# Patient Record
Sex: FEMALE | Race: WHITE
[De-identification: names, ages, dates, MRNs, and addresses within clinical notes are randomized per-mention and may not be internally consistent; named-entity substitution may affect disease eponyms.]

---

## 2018-03-25 ENCOUNTER — HOSPITAL ENCOUNTER (EMERGENCY)
Dept: HOSPITAL 97 - ER | Age: 33
Discharge: HOME | End: 2018-03-25
Payer: COMMERCIAL

## 2018-03-25 VITALS — OXYGEN SATURATION: 100 % | TEMPERATURE: 98 F

## 2018-03-25 VITALS — SYSTOLIC BLOOD PRESSURE: 118 MMHG | DIASTOLIC BLOOD PRESSURE: 76 MMHG

## 2018-03-25 DIAGNOSIS — R53.1: Primary | ICD-10-CM

## 2018-03-25 LAB
BUN BLD-MCNC: 8 MG/DL (ref 6–20)
GLUCOSE SERPLBLD-MCNC: 109 MG/DL (ref 65–120)
HCT VFR BLD CALC: 40.6 % (ref 36–45)
INR BLD: 1.04
LYMPHOCYTES # SPEC AUTO: 2.8 K/UL (ref 0.7–4.9)
MCH RBC QN AUTO: 25.1 PG (ref 27–35)
MCV RBC: 77.5 FL (ref 80–100)
PMV BLD: 7.7 FL (ref 7.6–11.3)
POTASSIUM SERPL-SCNC: 3.6 MEQ/L (ref 3.6–5)
RBC # BLD: 5.24 M/UL (ref 3.86–4.86)

## 2018-03-25 PROCEDURE — 80048 BASIC METABOLIC PNL TOTAL CA: CPT

## 2018-03-25 PROCEDURE — 85025 COMPLETE CBC W/AUTO DIFF WBC: CPT

## 2018-03-25 PROCEDURE — 82962 GLUCOSE BLOOD TEST: CPT

## 2018-03-25 PROCEDURE — 36415 COLL VENOUS BLD VENIPUNCTURE: CPT

## 2018-03-25 PROCEDURE — 99283 EMERGENCY DEPT VISIT LOW MDM: CPT

## 2018-03-25 PROCEDURE — 70450 CT HEAD/BRAIN W/O DYE: CPT

## 2018-03-25 PROCEDURE — 85610 PROTHROMBIN TIME: CPT

## 2018-03-25 PROCEDURE — 85730 THROMBOPLASTIN TIME PARTIAL: CPT

## 2018-03-25 PROCEDURE — 71045 X-RAY EXAM CHEST 1 VIEW: CPT

## 2018-03-25 NOTE — ER
Nurse's Notes                                                                                     

 [facility]                                                                

Name: Catalina Orosco                                                                                

Age: 32 yrs                                                                                       

Sex: Female                                                                                       

: 1985                                                                                   

MRN: [id number]                                                                                   

Arrival Date: 2018                                                                          

Time: 12:49                                                                                       

Account#: [id number]                                                                            

Bed 5                                                                                             

Private MD:                                                                                       

Diagnosis: Weakness                                                                               

                                                                                                  

Presentation:                                                                                     

                                                                                             

12:44 Presenting complaint: Patient states: Headache, left arm tingling, left sided weakness, hb  

      inability to hold head up that came on suddenly while at work. Pt reports she became        

      dizzy and decided to sit down just before the weakness and tingling started. Hx             

      migraines, had migraine last night that resolved before waking today. Last known normal     

      1230 today. Transition of care: patient was not received from another setting of care.      

      No acute neurological deficit is noted. Onset of symptoms was 2018 at 12:30.      

      Care prior to arrival: None.                                                                

12:44 Method Of Arrival: Wheelchair                                                           hb  

12:44 Acuity: ALYSIA 2                                                                           hb  

                                                                                                  

OB/GYN:                                                                                           

12:49 LMP 3/5/2018                                                                            hb  

                                                                                                  

Stroke Activation: Symptom onset < 3 hours                                                        

 Physician: Stroke Attending; Name: Dr. Padilla; Notified At:  12:44; Arrived             

  At:                                                                                             

 Physician: Chief Stroke Resident; Name: ; Notified At:  12:44; Arrived At:             

 Physician: Stroke Resident; Name: ; Notified At:  12:44; Arrived At:                   

 Physician: ED Attending; Name: ; Notified At:  12:44; Arrived At:                      

 Physician: ED Resident; Name: ; Notified At:  12:44; Arrived At:                       

                                                                                                  

Historical:                                                                                       

- Allergies:                                                                                      

13:22 No Known Allergies;                                                                     hb  

- Home Meds:                                                                                      

13:22 None [Active];                                                                          hb  

- PMHx:                                                                                           

13:22 Migraines;                                                                              hb  

- PSHx:                                                                                           

13:22 Ear Tubes; Cyst removed from breast;                                                    hb  

                                                                                                  

- Immunization history:: Adult Immunizations up to date.                                          

- Social history:: Smoking status: Patient/guardian denies using tobacco.                         

                                                                                                  

                                                                                                  

Screenin:55 Abuse screen: Denies threats or abuse. Denies injuries from another. Nutritional        hb  

      screening: No deficits noted. Tuberculosis screening: No symptoms or risk factors           

      identified. Fall Risk Total [place] Fall Scale indicates Low Risk Score (25-44 pts). Fall     

      prevention measures have been instituted. Side Rails Up X 2 Frequent Obs/Assesments         

      occuring As available Patient and Family Educated on Fall Prevention Program and            

      strategies.                                                                                 

                                                                                                  

Assessment:                                                                                       

12:44 Reassessment: Code stroke called, pt transported to CT via wheelchair. Pt slouching in    

      wheelchair, head hanging to right side, reports she is unable to hold her own head          

      upright.                                                                                    

12:49 Reassessment: Pt transferred to bed with minimal assistance, steady gait, head upright, hb  

      and used both arms to adjust self in the bed.                                               

12:50 Reassessment: 20g to RIGHT AC. Reassessment: labs sent with purple sticker to outside     

      lab by phlebotomy tech.                                                                     

12:53 Reassessment: BGL 58.                                                                   hb  

12:54 Reassessment: Dr. Padilla at bedside to evaluate pt.                                      hb  

13:01 Reassessment: Negative CT results called to Dr. Padilla.                                  hb  

13:02 Reassessment: 1/2 amp Dextrose administered IVP as ordered.                             hb  

13:05 Reassessment: PCXR done.                                                                hb  

13:06 Reassessment: Bedside Swallow Screen completed.                                         hb  

13:08 Patient has been NPO before screening. The patient is alert, and able to follow           

      commands. The patient does not exhibit slurred or garbled speech. The patient is            

      exhibiting difficulty speaking. The patient does not exhibit difficulty understanding       

      words. The patient is able to swallow own secretions with no drooling or need for           

      suction. Patient tolerated one teaspoon of water. No drooling, immediate coughing,          

      gurgling, or clearing of the throat was noted. The patient tolerated 90mL of water. No      

      drooling, immediate coughing, gurgling, or clearing of the throat was noted. The            

      patient passed the bedside swallow screening. Oral medications may be given as ordered.     

      Contact Physician for further diet orders. Provider notified of bedside swallow             

      screening results: Timi Padilla MD. T-PA (Activase) Screening: Indications: Definite       

      evidence of stroke, ischemic, embolic, or hypertensive: No.                                 

13:08 Reassessment: Decision to NOT tPA made by Dr. Padilla.                                    hb  

13:30 General: Appears in no apparent distress. Behavior is calm, cooperative. Pain: Denies     

      pain. Neuro: Level of Consciousness is awake, alert, obeys commands, Oriented to            

      person, place, time, situation,  are equal bilaterally Moves all extremities. Gait     

      is steady, Speech is normal, Facial symmetry appears normal, Pupils are PERRLA, Intact.     

13:30 Cardiovascular: Capillary refill < 3 seconds Patient's skin is warm and dry.            hb  

      Respiratory: Airway is patent Trachea midline Respiratory effort is even, unlabored,        

      Respiratory pattern is regular, symmetrical, Breath sounds are clear bilaterally. GI:       

      No signs and/or symptoms were reported involving the gastrointestinal system. : No        

      signs and/or symptoms were reported regarding the genitourinary system. EENT: No signs      

      and/or symptoms were reported regarding the EENT system. Derm: No signs and/or symptoms     

      reported regarding the dermatologic system. Skin is intact, is healthy with good            

      turgor, Skin is pink, warm \T\ dry. Musculoskeletal: Reports weakness in left arm and       

      left leg.                                                                                   

14:30 Reassessment: Patient appears in no apparent distress at this time. Patient and/or      hb  

      family updated on plan of care and expected duration. Pain level reassessed. Patient is     

      alert, oriented x 3, equal unlabored respirations, skin warm/dry/pink. Patient denies       

      pain at this time.                                                                          

15:20 Reassessment: Patient appears in no apparent distress at this time. Patient and/or      hb  

      family updated on plan of care and expected duration. Pain level reassessed. Patient is     

      alert, oriented x 3, equal unlabored respirations, skin warm/dry/pink. Patient denies       

      pain at this time. Patient states feeling better. Patient states symptoms have improved.    

                                                                                                  

Vital Signs:                                                                                      

12:49  / 91; Pulse 75; Resp 16; Temp 98; Pulse Ox 100% on R/A; Pain 0/10;               hb  

13:59  / 88; Pulse 69; Resp 18; Pulse Ox 100% ;                                         sv  

14:45  / 76; Pulse 70; Resp 15; Pulse Ox 100% on R/A;                                   hb  

                                                                                                  

NIH Stroke Scale Scores:                                                                          

12:59 NIHSS Score: 0                                                                          hb  

12:59 NIHSS Score: 0                                                                          hb  

14:57 NIHSS Score: 0                                                                          gs  

                                                                                                  

ED Course:                                                                                        

12:49 Patient arrived in ED.                                                                    

12:49 Timi Padilla MD is Attending Physician.                                              gs  

12:50 Inserted saline lock: 20 gauge in right antecubital area, using aseptic technique.      hb  

      Blood collected.                                                                            

12:58 Patient has correct armband on for positive identification. Placed in gown. Bed in low  hb  

      position. Call light in reach. Side rails up X 1.                                           

13:05 Arm band placed on right wrist.                                                         hb  

13:06 Gabi Menon, RN is Primary Nurse.                                                   hb  

13:19 Triage completed.                                                                       hb  

15:21 No provider procedures requiring assistance completed. IV discontinued, intact,         hb  

      bleeding controlled, No redness/swelling at site. Pressure dressing applied.                

                                                                                                  

Administered Medications:                                                                         

No medications were administered                                                                  

                                                                                                  

                                                                                                  

Point of Care Testing:                                                                            

      Blood Glucose:                                                                              

12:53 Blood Glucose: 58 mg/dL;                                                                hb  

13:29 Blood Glucose: 149 mg/dL;                                                               hb  

      Ranges:                                                                                     

                                                                                                  

Outcome:                                                                                          

15:03 Discharge ordered by MD.                                                                gs  

15:21 Discharged to home ambulatory, with friend.                                             hb  

15:21 Condition: stable                                                                           

15:21 Discharge instructions given to patient, Instructed on discharge instructions, follow       

      up and referral plans. medication usage, Demonstrated understanding of instructions,        

      follow-up care, medications.                                                                

15:22 Patient left the ED.                                                                      

                                                                                                  

                                                                                                  

NIH Stroke Scale - NIH Stroke Score                                                               

Date: 2018                                                                                  

Time: 12:59                                                                                       

Total Score = 0                                                                                   

  1a. Level of Consciousness (LOC) - 0(Alert)                                                     

  1b. Level of Consciousness (LOC) (Year \T\ Age) - 0(Both)                                       

  1c. LOC Commands (Open \T\ Closes Eyes/) - 0(Both)                                          

   2. Best Gaze (Lateral Gaze Paresis) - 0(Normal)                                                

   3. Visual Field Loss - 0(No visual loss)                                                       

   4. Facial Palsy - 0(Normal)                                                                    

  5a. Left Arm: Motor (10-second hold) - 0(No drift)                                              

  5b. Right Arm: Motor (10-second hold) - 0(No drift)                                             

  6a. Left Leg: Motor (5-second hold - always test supine) - 0(No drift)                          

  6b. Right Leg: Motor (5-second hold - always test supine) - 0(No drift)                         

   7. Limb Ataxia (finger/nose \T\ heel/shin - test with eyes open) - 0(Absent)                   

   8. Sensory Loss (pinprick arms/legs/face) - 0(Normal)                                          

   9. Best Language: Aphasia (description/naming/reading) - 0(No aphasia)                         

  10. Dysarthria (speech clarity - read or repeat words) - 0(Normal)                              

  11. Extinction and Inattention (visual/tactile/auditory/spatial/personal) - 0(No                

      abnormality)                                                                                

Initials:                                                                                       

                                                                                                  

                                                                                                  

NIH Stroke Scale - NIH Stroke Score                                                               

Date: 2018                                                                                  

Time: 12:59                                                                                       

Total Score = 0                                                                                   

  1a. Level of Consciousness (LOC) - 0(Alert)                                                     

  1b. Level of Consciousness (LOC) (Year \T\ Age) - 0(Both)                                       

  1c. LOC Commands (Open \T\ Closes Eyes/) - 0(Both)                                          

   2. Best Gaze (Lateral Gaze Paresis) - 0(Normal)                                                

   3. Visual Field Loss - 0(No visual loss)                                                       

   4. Facial Palsy - 0(Normal)                                                                    

  5a. Left Arm: Motor (10-second hold) - 0(No drift)                                              

  5b. Right Arm: Motor (10-second hold) - 0(No drift)                                             

  6a. Left Leg: Motor (5-second hold - always test supine) - 0(No drift)                          

  6b. Right Leg: Motor (5-second hold - always test supine) - 0(No drift)                         

   7. Limb Ataxia (finger/nose \T\ heel/shin - test with eyes open) - 0(Absent)                   

   8. Sensory Loss (pinprick arms/legs/face) - 0(Normal)                                          

   9. Best Language: Aphasia (description/naming/reading) - 0(No aphasia)                         

  10. Dysarthria (speech clarity - read or repeat words) - 0(Normal)                              

  11. Extinction and Inattention (visual/tactile/auditory/spatial/personal) - 0(No                

      abnormality)                                                                                

Initials:                                                                                       

                                                                                                  

                                                                                                  

NIH Stroke Scale - NIH Stroke Score                                                               

Date: 2018                                                                                  

Time: 14:57                                                                                       

Total Score = 0                                                                                   

  1a. Level of Consciousness (LOC) - 0(Alert)                                                     

  1b. Level of Consciousness (LOC) (Year \T\ Age) - 0(Both)                                       

  1c. LOC Commands (Open \T\ Closes Eyes/) - 0(Both)                                          

   2. Best Gaze (Lateral Gaze Paresis) - 0(Normal)                                                

   3. Visual Field Loss - 0(No visual loss)                                                       

   4. Facial Palsy - 0(Normal)                                                                    

  5a. Left Arm: Motor (10-second hold) - 0(No drift)                                              

  5b. Right Arm: Motor (10-second hold) - 0(No drift)                                             

  6a. Left Leg: Motor (5-second hold - always test supine) - 0(No drift)                          

  6b. Right Leg: Motor (5-second hold - always test supine) - 0(No drift)                         

   7. Limb Ataxia (finger/nose \T\ heel/shin - test with eyes open) - 0(Absent)                   

   8. Sensory Loss (pinprick arms/legs/face) - 0(Normal)                                          

   9. Best Language: Aphasia (description/naming/reading) - 0(No aphasia)                         

  10. Dysarthria (speech clarity - read or repeat words) - 0(Normal)                              

  11. Extinction and Inattention (visual/tactile/auditory/spatial/personal) - 0(No                

      abnormality)                                                                                

Initials: gs                                                                                      

                                                                                                  

Signatures:                                                                                       

Lizbeth Rodriguez RN RN                                                      

Isela Quezada RN RN                                                      

Gabi Menon RN RN                                                      

Timi Padilla MD MD   gs                                                   

                                                                                                  

Corrections: (The following items were deleted from the chart)                                    

13:45 13:24 Blood Glucose: Blood Glucose Qpixpsg=331 mg/dL. hb                        hb          

                                                                                                  

**************************************************************************************************

## 2018-03-25 NOTE — RAD REPORT
EXAM DESCRIPTION:  CT - Ct Stroke Brain Wo Cont - 3/25/2018 1:13 pm

 

CLINICAL HISTORY:  Migraine headache history, left-sided weakness, stroke protocol study

 

CLINICAL HISTORY:  CT June 2012

 

TECHNIQUE:  Axial 5 millimeter thick images of the head were obtained without IV contrast.

 

All CT scans are performed using dose optimization technique as appropriate and may include automated
 exposure control or mA/KV adjustment according to patient size.

 

FINDINGS:  No intracranial hemorrhage, mass, or cerebral edema. No acute infarction identifiable. No 
extra-axial fluid collections.  Gray matter-white matter differentiation is preserved.

 

 

Visualized portions of the mastoid air cells, paranasal sinuses, and orbits are unremarkable.

 

Imaging was only initially available in the exception folder of the PACs system. This precluded an im
mediate dictation. Findings were telephoned to Dr. Padilla 12:59 p.m.

 

IMPRESSION:  No CT evidence of acute intracranial process. No significant change from prior study.

## 2018-03-25 NOTE — EDPHYS
Physician Documentation                                                                           

 [facility]                                                                

Name: Catalina Orosco                                                                                

Age: 32 yrs                                                                                       

Sex: Female                                                                                       

: 1985                                                                                   

MRN: [id number]                                                                                   

Arrival Date: 2018                                                                          

Time: 12:49                                                                                       

Account#: [id number]                                                                            

Bed 5                                                                                             

Private MD:                                                                                       

ED Physician Timi Padilla                                                                       

HPI:                                                                                              

                                                                                             

14:57 This 32 yrs old  Female presents to ER via Wheelchair with complaints of       gs  

      Dizziness/general..                                                                         

14:57 The patient's problem is reported as weakness, that is generalized, dizziness. Onset:   gs  

      The symptoms/episode began/occurred gradually, this morning. Duration: The episode is       

      continuous. Context: occurred at home, went to work felt worse. The symptoms are            

      alleviated by nothing. The symptoms are aggravated by nothing. Associated signs and         

      symptoms: Pertinent positives: weakness, tightness in arms and legs doesn't want to         

      move due to pain. Severity of symptoms: At their worst the symptoms were moderate in        

      the emergency department the symptoms are unchanged. The patient has experienced            

      similar episodes in the past, a few times. had migraine last pm usual onset throbbing       

      resolved with exedrin.                                                                      

                                                                                                  

OB/GYN:                                                                                           

12:49 LMP 3/5/2018                                                                            hb  

                                                                                                  

Historical:                                                                                       

- Allergies:                                                                                      

13:22 No Known Allergies;                                                                     hb  

- Home Meds:                                                                                      

13:22 None [Active];                                                                          hb  

- PMHx:                                                                                           

13:22 Migraines;                                                                              hb  

- PSHx:                                                                                           

13:22 Ear Tubes; Cyst removed from breast;                                                    hb  

                                                                                                  

- Immunization history:: Adult Immunizations up to date.                                          

- Social history:: Smoking status: Patient/guardian denies using tobacco.                         

                                                                                                  

                                                                                                  

ROS:                                                                                              

14:57 All other systems are negative.                                                         gs  

                                                                                                  

Exam:                                                                                             

14:57 Head/Face:  Normocephalic, atraumatic. Eyes:  Pupils equal round and reactive to light, gs  

      extra-ocular motions intact.  Lids and lashes normal.  Conjunctiva and sclera are           

      non-icteric and not injected.  Cornea within normal limits.  Periorbital areas with no      

      swelling, redness, or edema. ENT:  Nares patent. No nasal discharge, no septal              

      abnormalities noted.  Tympanic membranes are normal and external auditory canals are        

      clear.  Oropharynx with no redness, swelling, or masses, exudates, or evidence of           

      obstruction, uvula midline.  Mucous membranes moist. Neck:  Trachea midline, no             

      thyromegaly or masses palpated, and no cervical lymphadenopathy.  Supple, full range of     

      motion without nuchal rigidity, or vertebral point tenderness.  No Meningismus.             

      Chest/axilla:  Normal chest wall appearance and motion.  Nontender with no deformity.       

      No lesions are appreciated. Cardiovascular:  Regular rate and rhythm with a normal S1       

      and S2.  No gallops, murmurs, or rubs.  Normal PMI, no JVD.  No pulse deficits.             

      Respiratory:  Lungs have equal breath sounds bilaterally, clear to auscultation and         

      percussion.  No rales, rhonchi or wheezes noted.  No increased work of breathing, no        

      retractions or nasal flaring. Abdomen/GI:  Soft, non-tender, with normal bowel sounds.      

      No distension or tympany.  No guarding or rebound.  No evidence of tenderness               

      throughout. Back:  No spinal tenderness.  No costovertebral tenderness.  Full range of      

      motion. Skin:  Warm, dry with normal turgor.  Normal color with no rashes, no lesions,      

      and no evidence of cellulitis. MS/ Extremity:  Pulses equal, no cyanosis.                   

      Neurovascular intact.  Full, normal range of motion. Neuro:  Awake and alert, GCS 15,       

      oriented to person, place, time, and situation.  Cranial nerves II-XII grossly intact.      

      Motor strength 5/5 in all extremities.  Sensory grossly intact.  Cerebellar exam            

      normal.  Normal gait.                                                                       

14:57 Neuro: doesn't want to move left arm and leg dur to feels tight no drift when lifts         

      arms or legs.                                                                               

                                                                                                  

Vital Signs:                                                                                      

12:49  / 91; Pulse 75; Resp 16; Temp 98; Pulse Ox 100% on R/A; Pain 0/10;               hb  

13:59  / 88; Pulse 69; Resp 18; Pulse Ox 100% ;                                         sv  

14:45  / 76; Pulse 70; Resp 15; Pulse Ox 100% on R/A;                                   hb  

                                                                                                  

NIH Stroke Scale Scores:                                                                          

12:59 NIHSS Score: 0                                                                          hb  

12:59 NIHSS Score: 0                                                                          hb  

14:57 NIHSS Score: 0                                                                          gs  

                                                                                                  

MDM:                                                                                              

12:58 Patient medically screened.                                                               

14:57 Differential diagnosis: CVA, TIA, paralysis, metabolic disorder, drug effects. Data       

      reviewed: vital signs, nurses notes. Response to treatment: the patient's symptoms have     

      resolved after treatment, and as a result, I will discharge patient.                        

                                                                                                  

                                                                                             

12:50 Order name: Basic Metabolic Panel                                                         

                                                                                             

12:50 Order name: CBC with Diff                                                                                                                                                            

12:50 Order name: Protime (+inr)                                                                                                                                                           

12:50 Order name: Ptt, Activated                                                                                                                                                           

12:50 Order name: Urine Microscopic Only                                                                                                                                                   

13:06 Order name: Glucose, Ancillary Testing; Complete Time: 14:25                            EDMS

                                                                                             

12:50 Order name: CT Stroke Brain w/o Contrast                                                                                                                                             

12:50 Order name: Stroke CXR 1 View                                                                                                                                                        

13:11 Order name: CBC with Automated Diff; Complete Time: 14:25                               EDMS

                                                                                             

13:14 Order name: Protime (+INR); Complete Time: 14:25                                        EDMS

                                                                                             

13:14 Order name: PTT, Activated Partial Thromb; Complete Time: 14:25                         EDMS

                                                                                             

13:15 Order name: Basic Metabolic Panel; Complete Time: 14:25                                 EDMS

                                                                                             

13:30 Order name: CT; Complete Time: 14:25                                                    EDMS

                                                                                             

13:30 Order name: Glucose, Ancillary Testing; Complete Time: 14:25                            EDMS

                                                                                             

12:50 Order name: Accucheck; Complete Time: 13:01                                                                                                                                          

12:50 Order name: Cardiac monitoring; Complete Time: 13:02                                                                                                                                 

12:50 Order name: EKG - Nurse/Tech; Complete Time: 14:10                                                                                                                                   

12:50 Order name: IV Saline Lock; Complete Time: 13:02                                                                                                                                     

12:50 Order name: Labs collected and sent; Complete Time: 14:10                                                                                                                            

12:50 Order name: NPO; Complete Time: 15:04                                                                                                                                                

12:50 Order name: O2 Per Protocol; Complete Time: 13:02                                                                                                                                    

12:50 Order name: O2 Sat Monitoring; Complete Time: 13:02                                                                                                                                  

12:50 Order name: Stroke Swallow Screen; Complete Time: 14:10                                                                                                                              

13:31 Order name: RAD; Complete Time: 14:25                                                   EDMS

                                                                                                  

Administered Medications:                                                                         

No medications were administered                                                                  

                                                                                                  

                                                                                                  

Point of Care Testing:                                                                            

      Blood Glucose:                                                                              

12:53 Blood Glucose: 58 mg/dL;                                                                hb  

13:29 Blood Glucose: 149 mg/dL;                                                               hb  

      Ranges:                                                                                     

      Critical Glucose Levels:Adult <50 mg/dl or >400 mg/dl  <40 mg/dl or >180 mg/dl       

Disposition:                                                                                      

18 15:03 Discharged to Home. Impression: Weakness.                                          

- Condition is Stable.                                                                            

- Discharge Instructions: Weakness, Fatigue.                                                      

                                                                                                  

- Medication Reconciliation Form, Thank You Letter, Antibiotic Education, Prescription            

  Opioid Use form.                                                                                

- Follow up: Private Physician; When: 2 - 3 days; Reason: Re-evaluation by your                   

  physician.                                                                                      

                                                                                                  

                                                                                                  

                                                                                                  

                                                                                                  

NIH Stroke Scale - NIH Stroke Score                                                               

Date: 2018                                                                                  

Time: 12:59                                                                                       

Total Score = 0                                                                                   

  1a. Level of Consciousness (LOC) - 0(Alert)                                                     

  1b. Level of Consciousness (LOC) (Year \T\ Age) - 0(Both)                                       

  1c. LOC Commands (Open \T\ Closes Eyes/) - 0(Both)                                          

   2. Best Gaze (Lateral Gaze Paresis) - 0(Normal)                                                

   3. Visual Field Loss - 0(No visual loss)                                                       

   4. Facial Palsy - 0(Normal)                                                                    

  5a. Left Arm: Motor (10-second hold) - 0(No drift)                                              

  5b. Right Arm: Motor (10-second hold) - 0(No drift)                                             

  6a. Left Leg: Motor (5-second hold - always test supine) - 0(No drift)                          

  6b. Right Leg: Motor (5-second hold - always test supine) - 0(No drift)                         

   7. Limb Ataxia (finger/nose \T\ heel/shin - test with eyes open) - 0(Absent)                   

   8. Sensory Loss (pinprick arms/legs/face) - 0(Normal)                                          

   9. Best Language: Aphasia (description/naming/reading) - 0(No aphasia)                         

  10. Dysarthria (speech clarity - read or repeat words) - 0(Normal)                              

  11. Extinction and Inattention (visual/tactile/auditory/spatial/personal) - 0(No                

      abnormality)                                                                                

Initials:                                                                                       

                                                                                                  

                                                                                                  

NIH Stroke Scale - NIH Stroke Score                                                               

Date: 2018                                                                                  

Time: 12:59                                                                                       

Total Score = 0                                                                                   

  1a. Level of Consciousness (LOC) - 0(Alert)                                                     

  1b. Level of Consciousness (LOC) (Year \T\ Age) - 0(Both)                                       

  1c. LOC Commands (Open \T\ Closes Eyes/) - 0(Both)                                          

   2. Best Gaze (Lateral Gaze Paresis) - 0(Normal)                                                

   3. Visual Field Loss - 0(No visual loss)                                                       

   4. Facial Palsy - 0(Normal)                                                                    

  5a. Left Arm: Motor (10-second hold) - 0(No drift)                                              

  5b. Right Arm: Motor (10-second hold) - 0(No drift)                                             

  6a. Left Leg: Motor (5-second hold - always test supine) - 0(No drift)                          

  6b. Right Leg: Motor (5-second hold - always test supine) - 0(No drift)                         

   7. Limb Ataxia (finger/nose \T\ heel/shin - test with eyes open) - 0(Absent)                   

   8. Sensory Loss (pinprick arms/legs/face) - 0(Normal)                                          

   9. Best Language: Aphasia (description/naming/reading) - 0(No aphasia)                         

  10. Dysarthria (speech clarity - read or repeat words) - 0(Normal)                              

  11. Extinction and Inattention (visual/tactile/auditory/spatial/personal) - 0(No                

      abnormality)                                                                                

Initials:                                                                                       

                                                                                                  

                                                                                                  

NIH Stroke Scale - NIH Stroke Score                                                               

Date: 2018                                                                                  

Time: 14:57                                                                                       

Total Score = 0                                                                                   

  1a. Level of Consciousness (LOC) - 0(Alert)                                                     

  1b. Level of Consciousness (LOC) (Year \T\ Age) - 0(Both)                                       

  1c. LOC Commands (Open \T\ Closes Eyes/) - 0(Both)                                          

   2. Best Gaze (Lateral Gaze Paresis) - 0(Normal)                                                

   3. Visual Field Loss - 0(No visual loss)                                                       

   4. Facial Palsy - 0(Normal)                                                                    

  5a. Left Arm: Motor (10-second hold) - 0(No drift)                                              

  5b. Right Arm: Motor (10-second hold) - 0(No drift)                                             

  6a. Left Leg: Motor (5-second hold - always test supine) - 0(No drift)                          

  6b. Right Leg: Motor (5-second hold - always test supine) - 0(No drift)                         

   7. Limb Ataxia (finger/nose \T\ heel/shin - test with eyes open) - 0(Absent)                   

   8. Sensory Loss (pinprick arms/legs/face) - 0(Normal)                                          

   9. Best Language: Aphasia (description/naming/reading) - 0(No aphasia)                         

  10. Dysarthria (speech clarity - read or repeat words) - 0(Normal)                              

  11. Extinction and Inattention (visual/tactile/auditory/spatial/personal) - 0(No                

      abnormality)                                                                                

Initials:                                                                                       

                                                                                                  

Signatures:                                                                                       

Dispatcher [employer]                           EDMS                                                 

Isela Quezada RN RN                                                      

Menon, Gabi, RN                     RN                                                      

Timi Padilla MD MD   gs                                                   

                                                                                                  

Corrections: (The following items were deleted from the chart)                                    

15:01 14:57 This 32 yrs old  Female presents to ER via Wheelchair with       gs          

      complaints of Dizziness/general weaknessS/S of Possible Stroke. gs                          

                                                                                                  

**************************************************************************************************

## 2018-03-25 NOTE — RAD REPORT
EXAM DESCRIPTION:  RAD - Chest Single View - 3/25/2018 1:23 pm

 

CLINICAL HISTORY:  Code stroke chest film

 

COMPARISON:  None.

 

TECHNIQUE:  AP portable chest image was obtained 1302 hours .

 

FINDINGS:  Lungs are clear. Heart and vasculature are normal. No measurable pleural effusion and no p
neumothorax. No gross bony abnormality seen. No acute aortic findings suspected.

 

IMPRESSION:  No acute cardiopulmonary process.

## 2018-10-11 ENCOUNTER — HOSPITAL ENCOUNTER (OUTPATIENT)
Dept: HOSPITAL 97 - OR | Age: 33
Discharge: HOME | End: 2018-10-11
Attending: OBSTETRICS & GYNECOLOGY
Payer: COMMERCIAL

## 2018-10-11 VITALS — TEMPERATURE: 97.1 F | SYSTOLIC BLOOD PRESSURE: 114 MMHG | DIASTOLIC BLOOD PRESSURE: 66 MMHG | OXYGEN SATURATION: 98 %

## 2018-10-11 DIAGNOSIS — N94.6: ICD-10-CM

## 2018-10-11 DIAGNOSIS — N92.1: Primary | ICD-10-CM

## 2018-10-11 DIAGNOSIS — K58.9: ICD-10-CM

## 2018-10-11 DIAGNOSIS — N88.2: ICD-10-CM

## 2018-10-11 PROCEDURE — 81025 URINE PREGNANCY TEST: CPT

## 2018-10-11 PROCEDURE — 88305 TISSUE EXAM BY PATHOLOGIST: CPT

## 2018-10-11 PROCEDURE — 0UJD8ZZ INSPECTION OF UTERUS AND CERVIX, VIA NATURAL OR ARTIFICIAL OPENING ENDOSCOPIC: ICD-10-PCS

## 2018-10-11 PROCEDURE — 0UDB7ZX EXTRACTION OF ENDOMETRIUM, VIA NATURAL OR ARTIFICIAL OPENING, DIAGNOSTIC: ICD-10-PCS

## 2018-10-11 RX ADMIN — LIDOCAINE HYDROCHLORIDE AND EPINEPHRINE ONE ML: 10; 10 INJECTION, SOLUTION INFILTRATION; PERINEURAL at 10:38

## 2018-10-11 RX ADMIN — LIDOCAINE HYDROCHLORIDE AND EPINEPHRINE ONE ML: 10; 10 INJECTION, SOLUTION INFILTRATION; PERINEURAL at 10:30

## 2018-10-11 NOTE — XMS REPORT
[facility]

 Created on:2018



Patient:Catalina Orosco

Sex:Female

:1985

External Reference #:[id number]





Demographics







 Address  [address] [place] [address] [place] [address]

 

 Phone  Unavailable

 

 Preferred Language  en

 

 Marital Status  Unknown

 

 Jainism Affiliation  Unknown

 

 Race  White

 

 Ethnic Group  Not  or 









Author







 Organization  [employer]









Care Team Providers







 Name  Role  Phone

 

 Julian, Gualberto  Provider Role  Unavailable









Allergies

No Known Allergies



Problems







 Problem Type  Condition  Code  Onset Dates  Condition Status

 

 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



   grade dysplasia      

 

 Assessment  Migraine, unspecified, not  G43.901    Active



   intractable, with status      



   migrainosus      

 

 Assessment  Cervicalgia  M54.2    Active

 

 Assessment  Microcytic anemia  D50.9    Active

 

 Problem  Microcytic anemia  D50.9    Active

 

 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



   grade dysplasia      

 

 Problem  Cervicalgia  M54.2    Active

 

 Problem  Anxiety  F41.9    Active

 

 Problem  Hypoglycemia  E16.2    Active

 

 Problem  Migraine, unspecified, not  G43.901    Active



   intractable, with status      



   migrainosus      

 

 Problem  Lumbar sprain  S33.5XXA    Active







Medications







 Medication  Code  Code  Instructions  Start  End  Status  Dosage



   System      Date  Date    

 

 Excedrin Tension  NDC  25556710072  500-65 MG      Active  2 tablets



 Headache      Orally Three        as needed



       times a day        

 

 Bentyl  NDC  40696072893  20 MG Orally  March    Active  1 tablet



       Four times a  2018      



       day        

 

 Ondansetron HCl  NDC  83330725503  4 MG Orally  March    Active  not defined



         2018      

 

 Pantoprazole  NDC  86928631690  40 MG Orally      Active  1 tablet



 Sodium      Once a day        

 

 Sumatriptan  NDC  26644675034  50 MG Orally  April    Active  1 tablet as



 Succinate      take at start  2018      needed



       of migrane, if        



       no relief take        



       1 more in 2        



       hours.  Max        



       200mg/day        

 

 Diclofenac  NDC  [id number]  1 % Transdermal    Active  as directed



 Sodium      Three times a  02, 2018  29,    



       day    2018    







Results

No Known Results



Summary Purpose

[employer]Clinical[employer] Submission

## 2018-10-11 NOTE — XMS REPORT
[facility]

 Created on:May 2, 2018



Patient:Catalina Orosco

Sex:Female

:1985

External Reference #:[id number]





Demographics







 Address  [address] [place] [address] [place] [address]

 

 Phone  Unavailable

 

 Preferred Language  en

 

 Marital Status  Unknown

 

 Synagogue Affiliation  Unknown

 

 Race  White

 

 Ethnic Group  Not  or 









Author







 Organization  [employer]









Care Team Providers







 Name  Role  Phone

 

 Liz Chance  Provider Role  Unavailable









Allergies

No Known Allergies



Problems







 Problem Type  Condition  Code  Onset Dates  Condition Status

 

 Problem  Microcytic anemia  D50.9    Active

 

 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



   grade dysplasia      

 

 Problem  Cervicalgia  M54.2    Active

 

 Problem  Anxiety  F41.9    Active

 

 Problem  Hypoglycemia  E16.2    Active

 

 Problem  Migraine, unspecified, not  G43.901    Active



   intractable, with status      



   migrainosus      

 

 Problem  Lumbar sprain  S33.5XXA    Active







Medications

No Known Medications



Results

No Known Results



Summary Purpose

[employer]Clinical[employer] Submission

## 2018-10-11 NOTE — XMS REPORT
[facility]

 Created on:2018



Patient:Catalina Orosco

Sex:Female

:1985

External Reference #:[id number]





Demographics







 Address  [address] [place] [address] [place] [address]

 

 Phone  Unavailable

 

 Preferred Language  en

 

 Marital Status  Unknown

 

 Tenriism Affiliation  Unknown

 

 Race  White

 

 Ethnic Group  Not  or 









Author







 Organization  [employer]









Care Team Providers







 Name  Role  Phone

 

 Liz Chance  Provider Role  Unavailable









Allergies, Adverse Reactions, Alerts







 Substance  Reaction  Event Type

 

 N.K.D.A.  Info Not Available  Non Drug Allergy







Problems







 Problem Type  Condition  Code  Onset Dates  Condition Status

 

 Assessment  Hypoglycemia  E16.2    Active

 

 Assessment  Weakness  R53.1    Active

 

 Problem  Microcytic anemia  D50.9    Active

 

 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



   grade dysplasia      

 

 Problem  Cervicalgia  M54.2    Active

 

 Problem  Anxiety  F41.9    Active

 

 Problem  Hypoglycemia  E16.2    Active

 

 Problem  Migraine, unspecified, not  G43.901    Active



   intractable, with status      



   migrainosus      

 

 Problem  Lumbar sprain  S33.5XXA    Active







Medications







 Medication  Code  Code  Instructions  Start  End  Status  Dosage



   System      Date  Date    

 

 Bentyl  NDC  [id number]  20 MG Orally  March    Active  1 tablet



       Four times a  2018      



       day        

 

 Excedrin Tension  NDC  13326881670  500-65 MG      Active  2 tablets



 Headache      Orally Three        as needed



       times a day        

 

 Ondansetron HCl  NDC  41056515103  4 MG Orally  March    Active  not



         2018      defined

 

 Pantoprazole  NDC  74639491151  40 MG Orally      Active  1 tablet



 Sodium      Once a day        







Results

No Known Results



Summary Purpose

eClinicalWorks Submission

## 2018-10-11 NOTE — OP
Date of Procedure:  10/11/2018



Surgeon:  Mary Corey MD



Preoperative Diagnoses:  Menorrhagia, dysmenorrhea, failed office endometrial biopsy.



Postoperative Diagnoses:  Menorrhagia, dysmenorrhea, failed office endometrial biopsy.  Cervical sten
osis at the internal os and a slightly deviated uterine cavity.



Procedures Performed:  Hysteroscopy, dilation and curettage.



Anesthesia:  MAC plus paracervical block.



Specimens:  Endometrial curettings.



Complications:  None.



Drains:  None.



Condition:  The patient's condition is stable.



Findings:  Uterine cavity slightly anteflexed; however, the cervical canal appeared to have some dagmar
ation, so when I got to the canal, I had to manipulate in order to get into the uterine cavity.



Description Of Procedure:  After informed consent was verified, she was taken back to OR, placed in a
 supine fashion on the operating table.  After MAC was given, she was placed in a dorsal lithotomy po
sition.  A speculum was placed to expose the cervix.  Betadine prep x3 was done after injecting the a
nterior lip with 10 cc of 1% lidocaine mixed with 1:100,000 epinephrine.  Four and 8 o'clock position
s were also injected with 5 cc each.  Two Allis clamps were placed after opening up the external os w
ith a long hemostat.  Direct hysteroscopy with SlimLine hysteroscope with normal saline and 30-degree
 lens was done.  Uterine cavity was entered after slightly taking a little curved approach to getting
 to the internal os.  Once I was able to visualize the uterine cavity, endometrium was thickened, but
 no intracavitary lesions.  Both tubal ostia were well visualized.  Cavity empty.  Scope was removed.
  Endometrial curettings were performed.  There was some difficulty dilating the cervix as well.  The
 cavity was anteflexed and so I went ahead and dilated it to 16-Frisian.  Then, the small curette was 
used to perform curettings.  There was adequate amount of sample.  The patient tolerated the procedur
e well.  All instruments were removed.  Instrument, needle, and sponge counts were done and were lyndon
ect at the end of the case.  She will follow up with me in 1 week for results, appointment.





SK/RICO

DD:  10/11/2018 11:48:53Voice ID:  [id number]

DT:  10/11/2018 23:04:53Report ID:  [id number]

## 2018-10-11 NOTE — XMS REPORT
[facility]

 Created on:2018



Patient:Catalina Orosco

Sex:Female

:1985

External Reference #:[id number]





Demographics







 Address  [address] [place] [address] [place] [address]

 

 Phone  Unavailable

 

 Preferred Language  en

 

 Marital Status  Unknown

 

 Episcopalian Affiliation  Unknown

 

 Race  White

 

 Ethnic Group  Not  or 









Author







 Organization  [employer]









Care Team Providers







 Name  Role  Phone

 

 Liz Chance  Provider Role  Unavailable









Allergies

No Known Allergies



Problems







 Problem Type  Condition  Code  Onset Dates  Condition Status

 

 Problem  Hypoglycemia  E16.2    Active

 

 Problem  Microcytic anemia  D50.9    Active

 

 Problem  Cervicalgia  M54.2    Active

 

 Problem  Irregular periods/menstrual cycles  N92.6    Active

 

 Problem  Lumbar sprain  S33.5XXA    Active

 

 Problem  Anxiety  F41.9    Active

 

 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



   grade dysplasia      

 

 Problem  Migraine, unspecified, not  G43.901    Active



   intractable, with status      



   migrainosus      







Medications

No Known Medications



Results

No Known Results



Summary Purpose

[employer]ClinicalWorks Submission

## 2018-10-11 NOTE — XMS REPORT
[facility]

 Created on:2018



Patient:Catalina Orosco

Sex:Female

:1985

External Reference #:[id number]





Demographics







 Address  [address] [place] [address] [place] [address]

 

 Phone  Unavailable

 

 Preferred Language  en

 

 Marital Status  Unknown

 

 Worship Affiliation  Unknown

 

 Race  White

 

 Ethnic Group  Not  or 









Author







 Organization  [employer]









Care Team Providers







 Name  Role  Phone

 

 Liz Chance  Provider Role  Unavailable









Allergies

No Known Allergies



Problems







 Problem Type  Condition  Code  Onset Dates  Condition Status

 

 Problem  Microcytic anemia  D50.9    Active

 

 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



   grade dysplasia      

 

 Problem  Cervicalgia  M54.2    Active

 

 Problem  Anxiety  F41.9    Active

 

 Problem  Hypoglycemia  E16.2    Active

 

 Problem  Migraine, unspecified, not  G43.901    Active



   intractable, with status      



   migrainosus      

 

 Problem  Lumbar sprain  S33.5XXA    Active







Medications

No Known Medications



Results

No Known Results



Summary Purpose

[employer]ClinicalWorks Submission

## 2018-10-11 NOTE — XMS REPORT
[facility]

 Created on:2018



Patient:Catalina Orosco

Sex:Female

:1985

External Reference #:[id number]





Demographics







 Address  [address] [place] [address] [place] [address]

 

 Phone  Unavailable

 

 Preferred Language  en

 

 Marital Status  Unknown

 

 Jainism Affiliation  Unknown

 

 Race  White

 

 Ethnic Group  Not  or 









Author







 Organization  [employer]









Care Team Providers







 Name  Role  Phone

 

 Liz Chance  Provider Role  Unavailable









Allergies, Adverse Reactions, Alerts







 Substance  Reaction  Event Type

 

 N.K.D.A.  Info Not Available  Non Drug Allergy







Problems







 Problem Type  Condition  Code  Onset Dates  Condition Status

 

 Assessment  Fatigue, unspecified type  R53.83    Active

 

 Problem  Hypoglycemia  E16.2    Active

 

 Assessment  Low ferritin level  R79.0    Active

 

 Problem  Microcytic anemia  D50.9    Active

 

 Problem  Cervicalgia  M54.2    Active

 

 Problem  Irregular periods/menstrual cycles  N92.6    Active

 

 Problem  Lumbar sprain  S33.5XXA    Active

 

 Problem  Anxiety  F41.9    Active

 

 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



   grade dysplasia      

 

 Problem  Migraine, unspecified, not  G43.901    Active



   intractable, with status      



   migrainosus      

 

 Assessment  Diarrhea, unspecified type  R19.7    Active

 

 Assessment  Hormone imbalance  E34.9    Active

 

 Assessment  Irregular periods/menstrual cycles  N92.6    Active







Medications







 Medication  Code  Code  Instructions  Start  End  Status  Dosage



   System      Date  Date    

 

 Pantoprazole  NDC  66729346435  40 MG Orally      Active  1 tablet



 Sodium      Once a day        

 

 Trokendi XR  NDC  20722309917  50 MG Orally      Active  1 capsule



       Once a day        

 

 Ondansetron HCl  NDC  98260859771  4 MG Orally  March    Active  not defined



         2018      

 

 Sumatriptan  NDC  [id number]  50 MG Orally  April    Active  1 tablet as



 Succinate      take at start  2018      needed



       of migrane, if        



       no relief take        



       1 more in 2        



       hours.  Max        



       200mg/day        

 

 Excedrin Tension  NDC  09973325945  500-65 MG      Active  2 tablets



 Headache      Orally Three        as needed



       times a day        

 

 Diclofenac  NDC  24183472838  1 % Transdermal    Active  as directed



 Sodium      Three times a  02, 2018  29,    



       day    2018    

 

 Bentyl  NDC  22023370435  20 MG Orally  March    Active  1 tablet



       Four times a  2018      



       day        







Results

No Known Results



Summary Purpose

eClinicalWorks Submission

## 2018-12-05 LAB
HCT VFR BLD CALC: 39.2 % (ref 36–45)
LYMPHOCYTES # SPEC AUTO: 1.9 K/UL (ref 0.7–4.9)
MCH RBC QN AUTO: 25.8 PG (ref 27–35)
MCV RBC: 79.3 FL (ref 80–100)
PMV BLD: 8.2 FL (ref 7.6–11.3)
RBC # BLD: 4.95 M/UL (ref 3.86–4.86)
UA COMPLETE W REFLEX CULTURE PNL UR: (no result)
UA DIPSTICK W REFLEX MICRO PNL UR: (no result)

## 2018-12-11 ENCOUNTER — HOSPITAL ENCOUNTER (OUTPATIENT)
Dept: HOSPITAL 97 - OR | Age: 33
Discharge: HOME | End: 2018-12-11
Attending: OBSTETRICS & GYNECOLOGY
Payer: COMMERCIAL

## 2018-12-11 VITALS — SYSTOLIC BLOOD PRESSURE: 100 MMHG | OXYGEN SATURATION: 98 % | DIASTOLIC BLOOD PRESSURE: 54 MMHG

## 2018-12-11 VITALS — TEMPERATURE: 97.4 F

## 2018-12-11 DIAGNOSIS — N70.11: ICD-10-CM

## 2018-12-11 DIAGNOSIS — D50.0: ICD-10-CM

## 2018-12-11 DIAGNOSIS — N92.0: ICD-10-CM

## 2018-12-11 DIAGNOSIS — K21.9: ICD-10-CM

## 2018-12-11 DIAGNOSIS — N94.5: ICD-10-CM

## 2018-12-11 DIAGNOSIS — N80.8: ICD-10-CM

## 2018-12-11 DIAGNOSIS — N80.3: ICD-10-CM

## 2018-12-11 DIAGNOSIS — N80.0: Primary | ICD-10-CM

## 2018-12-11 PROCEDURE — 0UBF4ZZ EXCISION OF CUL-DE-SAC, PERCUTANEOUS ENDOSCOPIC APPROACH: ICD-10-PCS

## 2018-12-11 PROCEDURE — 81025 URINE PREGNANCY TEST: CPT

## 2018-12-11 PROCEDURE — 86900 BLOOD TYPING SEROLOGIC ABO: CPT

## 2018-12-11 PROCEDURE — 36415 COLL VENOUS BLD VENIPUNCTURE: CPT

## 2018-12-11 PROCEDURE — 88305 TISSUE EXAM BY PATHOLOGIST: CPT

## 2018-12-11 PROCEDURE — 81003 URINALYSIS AUTO W/O SCOPE: CPT

## 2018-12-11 PROCEDURE — 0DBP4ZZ EXCISION OF RECTUM, PERCUTANEOUS ENDOSCOPIC APPROACH: ICD-10-PCS

## 2018-12-11 PROCEDURE — 3E0P8KZ INTRODUCTION OF OTHER DIAGNOSTIC SUBSTANCE INTO FEMALE REPRODUCTIVE, VIA NATURAL OR ARTIFICIAL OPENING ENDOSCOPIC: ICD-10-PCS

## 2018-12-11 PROCEDURE — 85025 COMPLETE CBC W/AUTO DIFF WBC: CPT

## 2018-12-11 PROCEDURE — 86850 RBC ANTIBODY SCREEN: CPT

## 2018-12-11 PROCEDURE — 86901 BLOOD TYPING SEROLOGIC RH(D): CPT

## 2018-12-11 PROCEDURE — 81015 MICROSCOPIC EXAM OF URINE: CPT

## 2018-12-11 PROCEDURE — 0TB74ZZ EXCISION OF LEFT URETER, PERCUTANEOUS ENDOSCOPIC APPROACH: ICD-10-PCS

## 2018-12-11 PROCEDURE — 0UN54ZZ RELEASE RIGHT FALLOPIAN TUBE, PERCUTANEOUS ENDOSCOPIC APPROACH: ICD-10-PCS

## 2018-12-11 PROCEDURE — 0UJD8ZZ INSPECTION OF UTERUS AND CERVIX, VIA NATURAL OR ARTIFICIAL OPENING ENDOSCOPIC: ICD-10-PCS

## 2018-12-11 PROCEDURE — 0WBF4ZZ EXCISION OF ABDOMINAL WALL, PERCUTANEOUS ENDOSCOPIC APPROACH: ICD-10-PCS

## 2018-12-11 RX ADMIN — MEPERIDINE HYDROCHLORIDE ONE MG: 50 INJECTION, SOLUTION INTRAMUSCULAR; INTRAVENOUS; SUBCUTANEOUS at 11:11

## 2018-12-11 RX ADMIN — MEPERIDINE HYDROCHLORIDE ONE MG: 50 INJECTION, SOLUTION INTRAMUSCULAR; INTRAVENOUS; SUBCUTANEOUS at 10:35

## 2018-12-11 RX ADMIN — MEPERIDINE HYDROCHLORIDE ONE MG: 50 INJECTION, SOLUTION INTRAMUSCULAR; INTRAVENOUS; SUBCUTANEOUS at 10:46

## 2018-12-11 RX ADMIN — MEPERIDINE HYDROCHLORIDE ONE MG: 50 INJECTION, SOLUTION INTRAMUSCULAR; INTRAVENOUS; SUBCUTANEOUS at 10:40

## 2018-12-11 RX ADMIN — MEPERIDINE HYDROCHLORIDE ONE MG: 50 INJECTION, SOLUTION INTRAMUSCULAR; INTRAVENOUS; SUBCUTANEOUS at 10:56

## 2018-12-11 RX ADMIN — MEPERIDINE HYDROCHLORIDE ONE MG: 50 INJECTION, SOLUTION INTRAMUSCULAR; INTRAVENOUS; SUBCUTANEOUS at 11:06

## 2018-12-11 RX ADMIN — MEPERIDINE HYDROCHLORIDE ONE MG: 50 INJECTION, SOLUTION INTRAMUSCULAR; INTRAVENOUS; SUBCUTANEOUS at 10:51

## 2018-12-11 RX ADMIN — MEPERIDINE HYDROCHLORIDE ONE MG: 50 INJECTION, SOLUTION INTRAMUSCULAR; INTRAVENOUS; SUBCUTANEOUS at 11:01

## 2018-12-11 NOTE — XMS REPORT
[facility]

 Created on:2018



Patient:Catalina Orosco

Sex:Female

:1985

External Reference #:[id number]





Demographics







 Address  [address] [place] [address] [place] [address]

 

 Phone  Unavailable

 

 Preferred Language  en

 

 Marital Status  Unknown

 

 Episcopal Affiliation  Unknown

 

 Race  White

 

 Ethnic Group  Not  or 









Author







 Organization  [employer]









Care Team Providers







 Name  Role  Phone

 

 Liz Chance  Provider Role  Unavailable









Allergies

No Known Allergies



Problems







 Problem Type  Condition  Code  Onset Dates  Condition Status

 

 Problem  Hypoglycemia  E16.2    Active

 

 Problem  Microcytic anemia  D50.9    Active

 

 Problem  Cervicalgia  M54.2    Active

 

 Problem  Irregular periods/menstrual cycles  N92.6    Active

 

 Problem  Lumbar sprain  S33.5XXA    Active

 

 Problem  Anxiety  F41.9    Active

 

 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



   grade dysplasia      

 

 Problem  Migraine, unspecified, not  G43.901    Active



   intractable, with status      



   migrainosus      







Medications

No Known Medications



Results

No Known Results



Summary Purpose

eClinicalWorks Submission

## 2018-12-11 NOTE — XMS REPORT
[facility]

 Created on:2018



Patient:Catalina Orosco

Sex:Female

:1985

External Reference #:[id number]





Demographics







 Address  [address] [place] [address] [place] [address]

 

 Phone  Unavailable

 

 Preferred Language  en

 

 Marital Status  Unknown

 

 Hoahaoism Affiliation  Unknown

 

 Race  White

 

 Ethnic Group  Not  or 









Author







 Organization  [employer]









Care Team Providers







 Name  Role  Phone

 

 Liz Chance  Provider Role  Unavailable









Allergies

No Known Allergies



Problems







 Problem Type  Condition  Code  Onset Dates  Condition Status

 

 Problem  Microcytic anemia  D50.9    Active

 

 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



   grade dysplasia      

 

 Problem  Cervicalgia  M54.2    Active

 

 Problem  Anxiety  F41.9    Active

 

 Problem  Hypoglycemia  E16.2    Active

 

 Problem  Migraine, unspecified, not  G43.901    Active



   intractable, with status      



   migrainosus      

 

 Problem  Lumbar sprain  S33.5XXA    Active







Medications

No Known Medications



Results

No Known Results



Summary Purpose

eClinicalWorks Submission

## 2018-12-11 NOTE — XMS REPORT
[facility]

 Created on:2018



Patient:Catalina Orosco

Sex:Female

:1985

External Reference #:[id number]





Demographics







 Address  [address] [place] [address] [place] [address]

 

 Phone  Unavailable

 

 Preferred Language  en

 

 Marital Status  Unknown

 

 Mandaeism Affiliation  Unknown

 

 Race  White

 

 Ethnic Group  Not  or 









Author







 Organization  [employer]









Care Team Providers







 Name  Role  Phone

 

 Julian, Gualberto  Provider Role  Unavailable









Allergies

No Known Allergies



Problems







 Problem Type  Condition  Code  Onset Dates  Condition Status

 

 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



   grade dysplasia      

 

 Assessment  Migraine, unspecified, not  G43.901    Active



   intractable, with status      



   migrainosus      

 

 Assessment  Cervicalgia  M54.2    Active

 

 Assessment  Microcytic anemia  D50.9    Active

 

 Problem  Microcytic anemia  D50.9    Active

 

 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



   grade dysplasia      

 

 Problem  Cervicalgia  M54.2    Active

 

 Problem  Anxiety  F41.9    Active

 

 Problem  Hypoglycemia  E16.2    Active

 

 Problem  Migraine, unspecified, not  G43.901    Active



   intractable, with status      



   migrainosus      

 

 Problem  Lumbar sprain  S33.5XXA    Active







Medications







 Medication  Code  Code  Instructions  Start  End  Status  Dosage



   System      Date  Date    

 

 Excedrin Tension  NDC  58789166701  500-65 MG      Active  2 tablets



 Headache      Orally Three        as needed



       times a day        

 

 Bentyl  NDC  [id number]  20 MG Orally  March    Active  1 tablet



       Four times a  2018      



       day        

 

 Ondansetron HCl  NDC  15960304059  4 MG Orally  March    Active  not defined



         2018      

 

 Pantoprazole  NDC  10465113773  40 MG Orally      Active  1 tablet



 Sodium      Once a day        

 

 Sumatriptan  NDC  42208615524  50 MG Orally  April    Active  1 tablet as



 Succinate      take at start  2018      needed



       of migrane, if        



       no relief take        



       1 more in 2        



       hours.  Max        



       200mg/day        

 

 Diclofenac  NDC  89976242409  1 % Transdermal    Active  as directed



 Sodium      Three times a  02, 2018  29,    



       day    2018    







Results

No Known Results



Summary Purpose

eClinicalWorks Submission

## 2018-12-11 NOTE — XMS REPORT
[facility]

 Created on:May 2, 2018



Patient:Catalina Orosco

Sex:Female

:1985

External Reference #:[id number]





Demographics







 Address  [address] [place] [address] [place] [address]

 

 Phone  Unavailable

 

 Preferred Language  en

 

 Marital Status  Unknown

 

 Temple Affiliation  Unknown

 

 Race  White

 

 Ethnic Group  Not  or 









Author







 Organization  [employer]









Care Team Providers







 Name  Role  Phone

 

 Liz Chance  Provider Role  Unavailable









Allergies

No Known Allergies



Problems







 Problem Type  Condition  Code  Onset Dates  Condition Status

 

 Problem  Microcytic anemia  D50.9    Active

 

 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



   grade dysplasia      

 

 Problem  Cervicalgia  M54.2    Active

 

 Problem  Anxiety  F41.9    Active

 

 Problem  Hypoglycemia  E16.2    Active

 

 Problem  Migraine, unspecified, not  G43.901    Active



   intractable, with status      



   migrainosus      

 

 Problem  Lumbar sprain  S33.5XXA    Active







Medications

No Known Medications



Results

No Known Results



Summary Purpose

eClinicalWorks Submission

## 2018-12-11 NOTE — XMS REPORT
[facility]

 Created on:2018



Patient:Catalina Orosco

Sex:Female

:1985

External Reference #:[id number]





Demographics







 Address  [address] [place] [address] [place] [address]

 

 Phone  Unavailable

 

 Preferred Language  en

 

 Marital Status  Unknown

 

 Scientology Affiliation  Unknown

 

 Race  White

 

 Ethnic Group  Not  or 









Author







 Organization  [employer]









Care Team Providers







 Name  Role  Phone

 

 Liz Chance  Provider Role  Unavailable









Allergies, Adverse Reactions, Alerts







 Substance  Reaction  Event Type

 

 N.K.D.A.  Info Not Available  Non Drug Allergy







Problems







 Problem Type  Condition  Code  Onset Dates  Condition Status

 

 Assessment  Hypoglycemia  E16.2    Active

 

 Assessment  Weakness  R53.1    Active

 

 Problem  Microcytic anemia  D50.9    Active

 

 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



   grade dysplasia      

 

 Problem  Cervicalgia  M54.2    Active

 

 Problem  Anxiety  F41.9    Active

 

 Problem  Hypoglycemia  E16.2    Active

 

 Problem  Migraine, unspecified, not  G43.901    Active



   intractable, with status      



   migrainosus      

 

 Problem  Lumbar sprain  S33.5XXA    Active







Medications







 Medication  Code  Code  Instructions  Start  End  Status  Dosage



   System      Date  Date    

 

 Bentyl  NDC  34720604445  20 MG Orally  March    Active  1 tablet



       Four times a  2018      



       day        

 

 Excedrin Tension  NDC  24499353776  500-65 MG      Active  2 tablets



 Headache      Orally Three        as needed



       times a day        

 

 Ondansetron HCl  NDC  44591608182  4 MG Orally  March    Active  not



         2018      defined

 

 Pantoprazole  NDC  41451919284  40 MG Orally      Active  1 tablet



 Sodium      Once a day        







Results

No Known Results



Summary Purpose

eClinicalWorks Submission

## 2018-12-11 NOTE — XMS REPORT
[facility]

 Created on:2018



Patient:Catalina Orosco

Sex:Female

:1985

External Reference #:[id number]





Demographics







 Address  [address] [place] [address] [place] [address]

 

 Phone  Unavailable

 

 Preferred Language  en

 

 Marital Status  Unknown

 

 Anabaptism Affiliation  Unknown

 

 Race  White

 

 Ethnic Group  Not  or 









Author







 Organization  [employer]









Care Team Providers







 Name  Role  Phone

 

 Liz Chance  Provider Role  Unavailable









Allergies, Adverse Reactions, Alerts







 Substance  Reaction  Event Type

 

 N.K.D.A.  Info Not Available  Non Drug Allergy







Problems







 Problem Type  Condition  Code  Onset Dates  Condition Status

 

 Assessment  Fatigue, unspecified type  R53.83    Active

 

 Problem  Hypoglycemia  E16.2    Active

 

 Assessment  Low ferritin level  R79.0    Active

 

 Problem  Microcytic anemia  D50.9    Active

 

 Problem  Cervicalgia  M54.2    Active

 

 Problem  Irregular periods/menstrual cycles  N92.6    Active

 

 Problem  Lumbar sprain  S33.5XXA    Active

 

 Problem  Anxiety  F41.9    Active

 

 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



   grade dysplasia      

 

 Problem  Migraine, unspecified, not  G43.901    Active



   intractable, with status      



   migrainosus      

 

 Assessment  Diarrhea, unspecified type  R19.7    Active

 

 Assessment  Hormone imbalance  E34.9    Active

 

 Assessment  Irregular periods/menstrual cycles  N92.6    Active







Medications







 Medication  Code  Code  Instructions  Start  End  Status  Dosage



   System      Date  Date    

 

 Pantoprazole  NDC  [id number]  40 MG Orally      Active  1 tablet



 Sodium      Once a day        

 

 Trokendi XR  NDC  [id number]  50 MG Orally      Active  1 capsule



       Once a day        

 

 Ondansetron HCl  NDC  [id number]  4 MG Orally  March    Active  not defined



         2018      

 

 Sumatriptan  NDC  [id number]  50 MG Orally  April    Active  1 tablet as



 Succinate      take at start  2018      needed



       of migrane, if        



       no relief take        



       1 more in 2        



       hours.  Max        



       200mg/day        

 

 Excedrin Tension  NDC  [id number]  500-65 MG      Active  2 tablets



 Headache      Orally Three        as needed



       times a day        

 

 Diclofenac  NDC  [id number]  1 % Transdermal    Active  as directed



 Sodium      Three times a  02, 2018  29,    



       day    2018    

 

 Bentyl  NDC  [id number]  20 MG Orally  March    Active  1 tablet



       Four times a  2018      



       day        







Results

No Known Results



Summary Purpose

eClinicalWorks Submission

## 2018-12-16 NOTE — OP
Date of Procedure:  12/11/2018



Surgeon:  Mary Corey MD



Assistant:  Ciara Jefferson.



Preoperative Diagnoses:  Menorrhagia, dysmenorrhea, and pelvic pain.



Postoperative Diagnoses:  Menorrhagia, dysmenorrhea, pelvic pain, endometriosis, and right hydrosalpi
nx.



Procedures Performed:  

1.Diagnostic hysteroscopy.

2.Diagnostic laparoscopy with endometriosis excision.

3.Left ureterolysis.

4.Right salpingolysis.

5.Chromotubation.



Estimated Blood Loss:  Minimal.



Specimens:  Endometriosis of the left periureteric area, right lateral wall, posterior left pararecta
l and cul-de-sac, and anterior left cul-de-sac.



Complications:  No complications.



Drains:  No drains.



Condition:  The patient's condition is stable.



Indications:  The patient is a 33-year-old, who was evaluated in the office, sampling done, no atypia
 or malignancy.  She has failed conservative treatment, observation, and NSAIDs in the past.  Needed 
to have relief from her pain, so that after the procedure was done for the bleeding, long-term plan w
as to use Mirena IUD system for control of bleeding and pain as well.  So, we discussed the benefits 
and risks of using the Mirena alone; surgery with Mirena, Mirena after the surgery of course; and nelson
miguel alone.  Then, consented the patient for diagnostic hysteroscopy, diagnostic laparoscopy, endomet
riosis excision, and then removal of tubes if hydrosalpinx is found because this could be the contrib
uting factor to her pain.



Description Of Procedure:  After informed consent was verified, the patient was brought to the OR, an
d placed in supine fashion on the operating table.  After general anesthesia was given, she was place
d in dorsal lithotomy position using Sunny stirrups.  Pelvic exam was done.  Uterus was anteflexed.  
No nodular infiltration could be identified on exam.  No adnexal masses were palpable. 



After the abdomen, vulva, vagina, and perineum were prepped and draped in a sterile fashion, You wa
s placed to drain the bladder, and cervix exposed with a bivalve speculum.  The arms were tucked by t
he side for the patient and all the safety features were confirmed before the start of the procedure.
  SCDs were started.  No antibiotics.  Anterior lip was grasped with 2 Allis clamps.  Diagnostic Slim
Line hysteroscope was used to enter the cervical canal, traversing directly under vision into the catherine
rine cavity.  Cavity was empty.  Both tubal ostia were visualized.  No intracavitary lesions were see
n.  Endometrium appeared to be unremarkable.  There were no anatomical defects either.  After the sco
pe was removed, diagnostic VCare was introduced into the uterus and left in place. 



A 1 cm infraumbilical incision was made with a scalpel using the open laparoscopy technique.  Fascia 
was incised, tagged with 0 Vicryl.  Peritoneum was picked up and opened sharply and gained entry into
 the peritoneal cavity.  The S retractors were placed.  Manuela introduced.  Site of entry was checked
 and no adhesions here despite all other surgeries.  After the abdomen was fully insufflated, upper a
bdominal surfaces were all visualized.  No evidence of any endometriosis.  The patient was placed in 
Trendelenburg position.  A 5 mm suprapubic left lower quadrant incision was made.  A 5 mm trocar was 
introduced.  Later on a 5 mm right lower quadrant was also placed for extra retraction during the end
ometriosis excision.  Both ovaries were checked, were unremarkable.  Tubes were checked.  The right t
ube appeared to have irregular edematous areas, possibly representing a hydrosalpinx.  The opening of
 the left tube fimbriated end appeared to be clubbed.  However, no dilation of the tube was noted.  U
reters were traced from the pelvic brim to the ureteric tunnel.  On the right, there was no distortio
n.  On the left, the distal 5 cm was not visualized due to the presence of endometriosis.  On the rig
ht side, there was sinus representing endometriosis and once close inspection was done there were end
ometriotic lesions in this area above and lateral to the ureter close to the ureteric tunnel.  Then, 
in the posterior cul-de-sac, there were no lesions, especially in the left pararectal area as well as
 onto the peritoneum covering the posterior wall of the vagina. 



The anterior cul-de-sac was visualized, and there were some endometriotic lesions and hemosiderin bro
wnish deposits on the peritoneum in the left anterior cul-de-sac peritoneum.  No nodularity was noted
. 



After coming down to the left ureter, the peritoneum was opened above and lateral to the ureter with 
scissors and monopolar needle.  Once a nice 5 to 6 cm incision was made, dissection was performed to 
open up the ureter and free it laterally.  It was attached to the medial leaf of the broad ligament. 
 Here, dissection was then performed between the ureter and median leaf of the broad ligament to sepa
rate it.  Once it was all dissected, only the distal most part was attached to the endometriotic impl
ants here.  The area of the implants was dissected leaving the implant alone attached to the ureter h
ere.  The monopolar needle was used to take down the rest of the peritoneum isolating it at the level
 of the ureter here.  The dissection was performed with the help of scissors and the endometriotic im
plants were completely removed with the ureteric not injured.  There was minimal bleeding at this poi
nt, which was cauterized with the help of a small tipped bipolar.  There was excellent hemostasis.  P
ictures were taken.  Specimen handed out. 



The right lateral wall was then targeted for removal of the endometriosis.  This appeared to be a sarah beth
p infiltrating nodule.  So, a peritoneal incision was made with the monopolar needle on the lateral a
nd superior aspect of the lesions, far away from the lesions.  Then, once the dissection was performe
d, the uterine artery was isolated laterally.  The ureter was present medially.  So, the dissection w
as performed all along opening giving myself a good peritoneal incision with the monopolar needle.  T
hen, the deep infiltrating lesion was excised with the push-spread technique, small push-spread with 
the Maryland and dissected with the monopolar needle.  The entire lesion was dissected away, and the 
ureter was medial and inferior to it and did not have any electrical, mechanical, or thermal injury t
o it.  All the specimen was removed and retrieved for Pathology.  An EEA sizer was placed in the rect
um to observe the proximity of the lesion and if it was attached to the rectal wall.  Once I identifi
ed that there was only peritoneal infiltration as well as the perirectal fat infiltration, an incisio
n was made on the peritoneum with sharp scissors, and taking down the peritoneum with the scissors, a
s well on the medial aspect of the lesion where it was covering the rectal wall.  Once this was isola
allan with push-spread technique, windows were made to identify the lesion.  Once the wall of the rectu
m was safe, this was taken down with the help of the monopolar needle, then the rest of the pararecta
l peritoneum was incised with the help of the monopolar, scoring all the way to the posterior vaginal
 wall, first removing the lesion in the left pararectal space.  Once this was  from the rect
um with the help of the monopolar needle, this was retrieved.  Then, the rest of the posterior cul-de
-sac extending onto the posterior vaginal wall lesion was also excised with the help of monopolar.  T
horough irrigation and suction were performed.  Bipolar was used for cautery in the pararectal space.
  There was excellent hemostasis.  Attention was directed to the anterior cul-de-sac lesion, picked u
p, and excised with the help of the monopolar needle.  No close proximity to the bladder or any vesse
ls.  This was removed.  Thorough irrigation and suction were performed here.  At this point, the left
 tube had to be checked out due to the presence of the appearance as dictated above as well as the le
ft tube with the distal clubbing.  The patient did not have a tubal and wanted to preserve for future
, was not planning any fertility, but plan was to preserve all her structures if possible. 



A vial of methylene blue was diluted with 100 cc of normal saline.  This was injected through the VCa
re.  There was filling of the right tube, but no spill after the first 30 cc was injected.  The left 
tube had no fill even in the proximal part.  After the second syringe was taken and it was injected, 
then the right tube had a small opening in the fimbriated end, and this spilled the methylene blue dy
e.  On the opposite side, there was no fill or spill.  There was filling of the left broad ligament. 
 I did not see appearance of any hydrosalpinx on the left side grossly despite there being no evidenc
e of patency.  I decided to leave this tube along because this probably is not the reason for her sabiha
n as there was no hydrosalpinx.  On the right side, due to evidence of the hydrosalpinx, I decided to
 open up the tube.  So, the distal fimbriated end was picked up with the help of Maryland and gently 
dissected to create an opening and release the hydrosalpinx.  Once this was done, there still appeare
d to be some scar tissue on the tube more proximally, but since there was no discussion of removal of
 tubes, I went ahead to preserve the tube.  Thorough irrigation and suction were performed.  After do
ing salpingolysis, this was left alone.  All the trocars were removed under direct vision.  The patie
nt tolerated the procedure well.  I also had a 3-0 Monocryl stitch on the epiploica of the bowel.  Th
is was for retraction.  Celio-Rose Marie was used to hold the stitch from the left upper quadrant.  Th
e retractor was removed.  The epiploica was inspected and had no evidence of any mechanical trauma fr
om the retraction.  After the trocars were removed, gas was desufflated.  Manuela was removed.  The fa
scial incision was closed with the help of a 0 Vicryl in a figure-of-eight fashion, simple 0 Vicryl s
titch to close the subcutaneous tissues, and 4-0 Monocryl to close all the incisions.  VCare and Fole
y were removed.  The patient was cleaned up, and recovered from anesthesia after the instrument, need
le, and sponge counts x3 were correct at the end of the case.  The patient tolerated the procedure we
ll.  Plan to follow up with me in 1 week.





OLENA

DD:  12/16/2018 07:56:02Voice ID:  [id number]

DT:  12/16/2018 17:02:24Report ID:  [id number]

## 2019-02-16 ENCOUNTER — HOSPITAL ENCOUNTER (EMERGENCY)
Dept: HOSPITAL 97 - ER | Age: 34
Discharge: HOME | End: 2019-02-16
Payer: COMMERCIAL

## 2019-02-16 VITALS — DIASTOLIC BLOOD PRESSURE: 73 MMHG | SYSTOLIC BLOOD PRESSURE: 117 MMHG | OXYGEN SATURATION: 98 %

## 2019-02-16 VITALS — TEMPERATURE: 98.6 F

## 2019-02-16 DIAGNOSIS — R10.30: Primary | ICD-10-CM

## 2019-02-16 LAB
ALBUMIN SERPL BCP-MCNC: 4.1 G/DL (ref 3.4–5)
ALP SERPL-CCNC: 77 U/L (ref 45–117)
ALT SERPL W P-5'-P-CCNC: 18 U/L (ref 12–78)
AST SERPL W P-5'-P-CCNC: 15 U/L (ref 15–37)
BUN BLD-MCNC: 12 MG/DL (ref 7–18)
GLUCOSE SERPLBLD-MCNC: 94 MG/DL (ref 74–106)
HCT VFR BLD CALC: 38.3 % (ref 36–45)
LIPASE SERPL-CCNC: 199 U/L (ref 73–393)
LYMPHOCYTES # SPEC AUTO: 0.9 K/UL (ref 0.7–4.9)
PMV BLD: 8.2 FL (ref 7.6–11.3)
POTASSIUM SERPL-SCNC: 4.1 MMOL/L (ref 3.5–5.1)
RBC # BLD: 4.83 M/UL (ref 3.86–4.86)

## 2019-02-16 PROCEDURE — 84703 CHORIONIC GONADOTROPIN ASSAY: CPT

## 2019-02-16 PROCEDURE — 81003 URINALYSIS AUTO W/O SCOPE: CPT

## 2019-02-16 PROCEDURE — 80076 HEPATIC FUNCTION PANEL: CPT

## 2019-02-16 PROCEDURE — 99284 EMERGENCY DEPT VISIT MOD MDM: CPT

## 2019-02-16 PROCEDURE — 85025 COMPLETE CBC W/AUTO DIFF WBC: CPT

## 2019-02-16 PROCEDURE — 36415 COLL VENOUS BLD VENIPUNCTURE: CPT

## 2019-02-16 PROCEDURE — 83690 ASSAY OF LIPASE: CPT

## 2019-02-16 PROCEDURE — 80048 BASIC METABOLIC PNL TOTAL CA: CPT

## 2019-02-16 NOTE — XMS REPORT
[facility]

 Created on:May 2, 2018



Patient:Catalina Orosco

Sex:Female

:1985

External Reference #:[id number]





Demographics







 Address  [address] [place] [address] [place] [address]

 

 Phone  Unavailable

 

 Preferred Language  en

 

 Marital Status  Unknown

 

 Tenriism Affiliation  Unknown

 

 Race  White

 

 Ethnic Group  Not  or 









Author







 Organization  [employer]









Care Team Providers







 Name  Role  Phone

 

 Liz Chance  Provider Role  Unavailable









Allergies

No Known Allergies



Problems







 Problem Type  Condition  Code  Onset Dates  Condition Status

 

 Problem  Microcytic anemia  D50.9    Active

 

 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



   grade dysplasia      

 

 Problem  Cervicalgia  M54.2    Active

 

 Problem  Anxiety  F41.9    Active

 

 Problem  Hypoglycemia  E16.2    Active

 

 Problem  Migraine, unspecified, not  G43.901    Active



   intractable, with status      



   migrainosus      

 

 Problem  Lumbar sprain  S33.5XXA    Active







Medications

No Known Medications



Results

No Known Results



Summary Purpose

[employer]ClinicalWorks Submission

## 2019-02-16 NOTE — XMS REPORT
[facility]

 Created on:2018



Patient:Catalina Orosco

Sex:Female

:1985

External Reference #:[id number]





Demographics







 Address  [address] [place] [address] [place] [address]

 

 Phone  Unavailable

 

 Preferred Language  en

 

 Marital Status  Unknown

 

 Episcopalian Affiliation  Unknown

 

 Race  White

 

 Ethnic Group  Not  or 









Author







 Organization  [employer]









Care Team Providers







 Name  Role  Phone

 

 Liz Chance  Provider Role  Unavailable









Allergies

No Known Allergies



Problems







 Problem Type  Condition  Code  Onset Dates  Condition Status

 

 Problem  Hypoglycemia  E16.2    Active

 

 Problem  Microcytic anemia  D50.9    Active

 

 Problem  Cervicalgia  M54.2    Active

 

 Problem  Irregular periods/menstrual cycles  N92.6    Active

 

 Problem  Lumbar sprain  S33.5XXA    Active

 

 Problem  Anxiety  F41.9    Active

 

 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



   grade dysplasia      

 

 Problem  Migraine, unspecified, not  G43.901    Active



   intractable, with status      



   migrainosus      







Medications

No Known Medications



Results

No Known Results



Summary Purpose

[employer]ClinicalWorks Submission

## 2019-02-16 NOTE — XMS REPORT
[facility]

 Created on:2018



Patient:Catalina Orosco

Sex:Female

:1985

External Reference #:[id number]





Demographics







 Address  [address] [place] [address] [place] [address]

 

 Phone  Unavailable

 

 Preferred Language  en

 

 Marital Status  Unknown

 

 Adventism Affiliation  Unknown

 

 Race  White

 

 Ethnic Group  Not  or 









Author







 Organization  [employer]









Care Team Providers







 Name  Role  Phone

 

 Liz Chance  Provider Role  Unavailable









Allergies, Adverse Reactions, Alerts







 Substance  Reaction  Event Type

 

 N.K.D.A.  Info Not Available  Non Drug Allergy







Problems







 Problem Type  Condition  Code  Onset Dates  Condition Status

 

 Assessment  Fatigue, unspecified type  R53.83    Active

 

 Problem  Hypoglycemia  E16.2    Active

 

 Assessment  Low ferritin level  R79.0    Active

 

 Problem  Microcytic anemia  D50.9    Active

 

 Problem  Cervicalgia  M54.2    Active

 

 Problem  Irregular periods/menstrual cycles  N92.6    Active

 

 Problem  Lumbar sprain  S33.5XXA    Active

 

 Problem  Anxiety  F41.9    Active

 

 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



   grade dysplasia      

 

 Problem  Migraine, unspecified, not  G43.901    Active



   intractable, with status      



   migrainosus      

 

 Assessment  Diarrhea, unspecified type  R19.7    Active

 

 Assessment  Hormone imbalance  E34.9    Active

 

 Assessment  Irregular periods/menstrual cycles  N92.6    Active







Medications







 Medication  Code  Code  Instructions  Start  End  Status  Dosage



   System      Date  Date    

 

 Pantoprazole  NDC  [id number]  40 MG Orally      Active  1 tablet



 Sodium      Once a day        

 

 Trokendi XR  NDC  [id number]  50 MG Orally      Active  1 capsule



       Once a day        

 

 Ondansetron HCl  NDC  12306849318  4 MG Orally  March    Active  not defined



         2018      

 

 Sumatriptan  NDC  [id number]  50 MG Orally  April    Active  1 tablet as



 Succinate      take at start  2018      needed



       of migrane, if        



       no relief take        



       1 more in 2        



       hours.  Max        



       200mg/day        

 

 Excedrin Tension  NDC  [id number]  500-65 MG      Active  2 tablets



 Headache      Orally Three        as needed



       times a day        

 

 Diclofenac  NDC  [id number]  1 % Transdermal    Active  as directed



 Sodium      Three times a  02, 2018  29,    



       day    2018    

 

 Bentyl  NDC  [id number]  20 MG Orally  March    Active  1 tablet



       Four times a  2018      



       day        







Results

No Known Results



Summary Purpose

eClinicalWorks Submission

## 2019-02-16 NOTE — XMS REPORT
[facility]

 Created on:2018



Patient:Catalina Orosco

Sex:Female

:1985

External Reference #:[id number]





Demographics







 Address  [address] [place] [address] [place] [address]

 

 Phone  Unavailable

 

 Preferred Language  en

 

 Marital Status  Unknown

 

 Episcopal Affiliation  Unknown

 

 Race  White

 

 Ethnic Group  Not  or 









Author







 Organization  [employer]Works









Care Team Providers







 Name  Role  Phone

 

 Julian, Gualberto  Provider Role  Unavailable









Allergies

No Known Allergies



Problems







 Problem Type  Condition  Code  Onset Dates  Condition Status

 

 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



   grade dysplasia      

 

 Assessment  Migraine, unspecified, not  G43.901    Active



   intractable, with status      



   migrainosus      

 

 Assessment  Cervicalgia  M54.2    Active

 

 Assessment  Microcytic anemia  D50.9    Active

 

 Problem  Microcytic anemia  D50.9    Active

 

 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



   grade dysplasia      

 

 Problem  Cervicalgia  M54.2    Active

 

 Problem  Anxiety  F41.9    Active

 

 Problem  Hypoglycemia  E16.2    Active

 

 Problem  Migraine, unspecified, not  G43.901    Active



   intractable, with status      



   migrainosus      

 

 Problem  Lumbar sprain  S33.5XXA    Active







Medications







 Medication  Code  Code  Instructions  Start  End  Status  Dosage



   System      Date  Date    

 

 Excedrin Tension  NDC  [id number]  500-65 MG      Active  2 tablets



 Headache      Orally Three        as needed



       times a day        

 

 Bentyl  NDC  [id number]  20 MG Orally  March    Active  1 tablet



       Four times a  2018      



       day        

 

 Ondansetron HCl  NDC  [id number]  4 MG Orally  March    Active  not defined



         2018      

 

 Pantoprazole  NDC  99624942095  40 MG Orally      Active  1 tablet



 Sodium      Once a day        

 

 Sumatriptan  NDC  73739126925  50 MG Orally  April    Active  1 tablet as



 Succinate      take at start  2018      needed



       of migrane, if        



       no relief take        



       1 more in 2        



       hours.  Max        



       200mg/day        

 

 Diclofenac  NDC  [id number]  1 % Transdermal    Active  as directed



 Sodium      Three times a  02, 2018  29,    



       day    2018    







Results

No Known Results



Summary Purpose

eClinicalWorks Submission

## 2019-02-16 NOTE — XMS REPORT
[facility]

 Created on:2018



Patient:Catalina Orosco

Sex:Female

:1985

External Reference #:[id number]





Demographics







 Address  [address] [place] [address] [place] [address]

 

 Phone  Unavailable

 

 Preferred Language  en

 

 Marital Status  Unknown

 

 Latter day Affiliation  Unknown

 

 Race  White

 

 Ethnic Group  Not  or 









Author







 Organization  [employer]









Care Team Providers







 Name  Role  Phone

 

 Liz Chance  Provider Role  Unavailable









Allergies, Adverse Reactions, Alerts







 Substance  Reaction  Event Type

 

 N.K.D.A.  Info Not Available  Non Drug Allergy







Problems







 Problem Type  Condition  Code  Onset Dates  Condition Status

 

 Assessment  Hypoglycemia  E16.2    Active

 

 Assessment  Weakness  R53.1    Active

 

 Problem  Microcytic anemia  D50.9    Active

 

 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



   grade dysplasia      

 

 Problem  Cervicalgia  M54.2    Active

 

 Problem  Anxiety  F41.9    Active

 

 Problem  Hypoglycemia  E16.2    Active

 

 Problem  Migraine, unspecified, not  G43.901    Active



   intractable, with status      



   migrainosus      

 

 Problem  Lumbar sprain  S33.5XXA    Active







Medications







 Medication  Code  Code  Instructions  Start  End  Status  Dosage



   System      Date  Date    

 

 Bentyl  NDC  [id number]  20 MG Orally  March    Active  1 tablet



       Four times a  2018      



       day        

 

 Excedrin Tension  NDC  [id number]  500-65 MG      Active  2 tablets



 Headache      Orally Three        as needed



       times a day        

 

 Ondansetron HCl  NDC  [id number]  4 MG Orally  March    Active  not



         2018      defined

 

 Pantoprazole  NDC  [id number]  40 MG Orally      Active  1 tablet



 Sodium      Once a day        







Results

No Known Results



Summary Purpose

eClinicalWorks Submission

## 2019-02-16 NOTE — EDPHYS
Physician Documentation                                                                           

 [facility]                                                                

Name: Catalina Orosco                                                                                

Age: 33 yrs                                                                                       

Sex: Female                                                                                       

: 1985                                                                                   

MRN: [id number]                                                                                   

Arrival Date: 2019                                                                          

Time: 10:17                                                                                       

Account#: [id number]                                                                            

Bed 6                                                                                             

Private MD: None, None                                                                            

ED Physician Timi Padilla                                                                       

HPI:                                                                                              

                                                                                             

14:13 This 33 yrs old  Female presents to ER via Ambulatory with complaints of       gs  

      Abdominal Pain.                                                                             

14:13 The patient presents with abdominal pain in the lower abdomen.                          gs  

14:25 Onset: The symptoms/episode began/occurred this morning. Associated signs and symptoms: gs  

      Pertinent positives: nausea, Pertinent negatives: vaginal discharge. The symptoms are       

      described as crampy. Severity of pain: At its worst the pain was moderate in the            

      emergency department the pain is unchanged. The patient has experienced similar             

      episodes in the past, multiple times.                                                       

                                                                                                  

OB/GYN:                                                                                           

10:33 LMP 2/3/2019                                                                            hj  

                                                                                                  

Historical:                                                                                       

- Allergies:                                                                                      

10:31 No Known Allergies;                                                                     hj  

- Home Meds:                                                                                      

10:31 Dexilant 60 mg oral CpDB 1 cap once daily [Active]; dicyclomine 20 mg Oral tab 1 tab 3  hj  

      times per day [Active];                                                                     

- PMHx:                                                                                           

10:31 Migraines; garzon; endometriosis;                                                      hj  

- PSHx:                                                                                           

10:31 Ear Tubes; Cyst removed from breast; cyst;                                              hj  

                                                                                                  

- Immunization history:: Adult Immunizations up to date.                                          

- Social history:: Smoking status: Patient/guardian denies using tobacco,                         

  Patient/guardian denies using alcohol.                                                          

- Ebola Screening: : Patient negative for fever greater than or equal to 101.5 degrees            

  Fahrenheit, and additional compatible Ebola Virus Disease symptoms Patient denies               

  exposure to infectious person Patient denies travel to an Ebola-affected area in the            

  21 days before illness onset.                                                                   

                                                                                                  

                                                                                                  

ROS:                                                                                              

14:25 All other systems are negative.                                                         gs  

                                                                                                  

Exam:                                                                                             

14:25 Head/Face:  Normocephalic, atraumatic. Eyes:  Pupils equal round and reactive to light, gs  

      extra-ocular motions intact.  Lids and lashes normal.  Conjunctiva and sclera are           

      non-icteric and not injected.  Cornea within normal limits.  Periorbital areas with no      

      swelling, redness, or edema. ENT:  Nares patent. No nasal discharge, no septal              

      abnormalities noted.  Tympanic membranes are normal and external auditory canals are        

      clear.  Oropharynx with no redness, swelling, or masses, exudates, or evidence of           

      obstruction, uvula midline.  Mucous membranes moist. Neck:  Trachea midline, no             

      thyromegaly or masses palpated, and no cervical lymphadenopathy.  Supple, full range of     

      motion without nuchal rigidity, or vertebral point tenderness.  No Meningismus.             

      Chest/axilla:  Normal chest wall appearance and motion.  Nontender with no deformity.       

      No lesions are appreciated. Cardiovascular:  Regular rate and rhythm with a normal S1       

      and S2.  No gallops, murmurs, or rubs.  Normal PMI, no JVD.  No pulse deficits.             

      Respiratory:  Lungs have equal breath sounds bilaterally, clear to auscultation and         

      percussion.  No rales, rhonchi or wheezes noted.  No increased work of breathing, no        

      retractions or nasal flaring. Back:  No spinal tenderness.  No costovertebral               

      tenderness.  Full range of motion. Skin:  Warm, dry with normal turgor.  Normal color       

      with no rashes, no lesions, and no evidence of cellulitis. MS/ Extremity:  Pulses           

      equal, no cyanosis.  Neurovascular intact.  Full, normal range of motion. Neuro:  Awake     

      and alert, GCS 15, oriented to person, place, time, and situation.  Cranial nerves          

      II-XII grossly intact.  Motor strength 5/5 in all extremities.  Sensory grossly intact.     

       Cerebellar exam normal.  Normal gait.                                                      

14:25 Constitutional: The patient appears alert, awake.                                           

14:25 Abdomen/GI: Palpation: mild abdominal tenderness, in the suprapubic area, right lower       

      quadrant and left lower quadrant.                                                           

                                                                                                  

Vital Signs:                                                                                      

10:33  / 89; Pulse 67; Resp 18; Temp 98.6(TE); Pulse Ox 100% on R/A; Weight 74.39 kg;   hj  

      Height 5 ft. 4 in. (162.56 cm); Pain 6/10;                                                  

14:49  / 73; Pulse 84; Resp 18; Pulse Ox 98% on R/A;                                    la1 

10:33 Body Mass Index 28.15 (74.39 kg, 162.56 cm)                                               

                                                                                                  

MDM:                                                                                              

11:36 Patient medically screened.                                                             gs  

14:28 Differential diagnosis: Irritable bowel syndrome, non-specific abd pain, urinary tract  gs  

      infection. Data reviewed: vital signs, nurses notes. Response to treatment: the             

      patient's symptoms have markedly improved after treatment, and as a result, I will          

      discharge patient.                                                                          

                                                                                                  

                                                                                             

11:23 Order name: Urine Dipstick--Ancillary (enter results); Complete Time: 13:32               

                                                                                             

11:23 Order name: Pregnancy Test, Serum; Complete Time: 14:13                                   

                                                                                             

11:37 Order name: Basic Metabolic Panel; Complete Time: 13:32                                   

                                                                                             

11:37 Order name: CBC with Diff; Complete Time: 13:32                                           

                                                                                             

11:37 Order name: Hepatic Function; Complete Time: 13:32                                        

                                                                                             

11:37 Order name: Lipase; Complete Time: 13:32                                                  

                                                                                             

11:37 Order name: IV Saline Lock; Complete Time: 11:51                                          

                                                                                             

11:37 Order name: Labs collected and sent; Complete Time: 11:51                                 

                                                                                                  

Administered Medications:                                                                         

12:11 Drug: Norco 5 mg-325 mg 1 tabs Route: PO;                                               la1 

14:09 Follow up: Response: No adverse reaction; Pain is decreased                             la1 

                                                                                                  

                                                                                                  

Disposition:                                                                                      

19 14:29 Discharged to Home. Impression: Lower abdominal pain, unspecified.                 

- Condition is Stable.                                                                            

- Discharge Instructions: Abdominal Pain, Adult.                                                  

- Prescriptions for Tylenol- Codeine #4 300-60 mg Oral Tablet - take 1 tablet by ORAL             

  route every 6 hours As needed; 6 tablet.                                                        

- Medication Reconciliation Form, Thank You Letter, Antibiotic Education, Prescription            

  Opioid Use form.                                                                                

- Follow up: Private Physician; When: 2 - 3 days; Reason: Re-evaluation by your                   

  physician.                                                                                      

                                                                                                  

                                                                                                  

                                                                                                  

Signatures:                                                                                       

[facility]                                                 

Tamir Matias RN                         RN   la1                                                  

Sin Pace RN RN                                                      

Timi Padilla MD MD                                                      

                                                                                                  

Corrections: (The following items were deleted from the chart)                                    

14:49 14:29 2019 14:29 Discharged to Home. Impression: Lower abdominal pain,            la1 

      unspecified. Condition is Stable. Forms are Medication Reconciliation Form, Thank You       

      Letter, Antibiotic Education, Prescription Opioid Use. Follow up: Private Physician;        

      When: 2 - 3 days; Reason: Re-evaluation by your physician.                                

                                                                                                  

**************************************************************************************************

## 2019-02-16 NOTE — XMS REPORT
[facility]

 Created on:2018



Patient:Catalina Orosco

Sex:Female

:1985

External Reference #:[id number]





Demographics







 Address  [address] [place] [address] [place] [address]

 

 Phone  Unavailable

 

 Preferred Language  en

 

 Marital Status  Unknown

 

 Oriental orthodox Affiliation  Unknown

 

 Race  White

 

 Ethnic Group  Not  or 









Author







 Organization  [employer]









Care Team Providers







 Name  Role  Phone

 

 Liz Chance  Provider Role  Unavailable









Allergies

No Known Allergies



Problems







 Problem Type  Condition  Code  Onset Dates  Condition Status

 

 Problem  Microcytic anemia  D50.9    Active

 

 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



   grade dysplasia      

 

 Problem  Cervicalgia  M54.2    Active

 

 Problem  Anxiety  F41.9    Active

 

 Problem  Hypoglycemia  E16.2    Active

 

 Problem  Migraine, unspecified, not  G43.901    Active



   intractable, with status      



   migrainosus      

 

 Problem  Lumbar sprain  S33.5XXA    Active







Medications

No Known Medications



Results

No Known Results



Summary Purpose

[employer] Submission

## 2019-02-16 NOTE — ER
Nurse's Notes                                                                                     

 [facility]                                                                

Name: Catalina Orosco                                                                                

Age: 33 yrs                                                                                       

Sex: Female                                                                                       

: 1985                                                                                   

MRN: [id number]                                                                                   

Arrival Date: 2019                                                                          

Time: 10:17                                                                                       

Account#: [id number]                                                                            

Bed 6                                                                                             

Private MD: None, None                                                                            

Diagnosis: Lower abdominal pain, unspecified                                                      

                                                                                                  

Presentation:                                                                                     

                                                                                             

10:29 Presenting complaint: Patient states: i have endometriosis, i have a lot of abd pain    hj  

      and id started last night; about 8 am today, the cramps got worse; took Motrin; reports     

      nausea and diarrhea;. Transition of care: patient was not received from another setting     

      of care. Onset of symptoms was 2019 at 10:30. Risk Assessment: Do you want     

      to hurt yourself or someone else? Patient reports no desire to harm self or others.         

      Initial Sepsis Screen: Does the patient meet any 2 criteria? Yes Does the patient have      

      a suspected source of infection? Yes:. Care prior to arrival: None.                         

10:29 Method Of Arrival: Ambulatory                                                             

10:29 Acuity: ALYSIA 3                                                                           hj  

                                                                                                  

Triage Assessment:                                                                                

10:32 General: Appears in no apparent distress. uncomfortable, Behavior is calm, cooperative, hj  

      appropriate for age. Pain: Complains of pain in abdomen Pain currently is 6 out of 10       

      on a pain scale. GI: Reports lower abdominal pain, cramping, diarrhea, nausea.              

                                                                                                  

OB/GYN:                                                                                           

10:33 LMP 2/3/2019                                                                              

                                                                                                  

Historical:                                                                                       

- Allergies:                                                                                      

10:31 No Known Allergies;                                                                     hj  

- Home Meds:                                                                                      

10:31 Dexilant 60 mg oral CpDB 1 cap once daily [Active]; dicyclomine 20 mg Oral tab 1 tab 3  hj  

      times per day [Active];                                                                     

- PMHx:                                                                                           

10:31 Migraines; garzon; endometriosis;                                                      hj  

- PSHx:                                                                                           

10:31 Ear Tubes; Cyst removed from breast; cyst;                                              hj  

                                                                                                  

- Immunization history:: Adult Immunizations up to date.                                          

- Social history:: Smoking status: Patient/guardian denies using tobacco,                         

  Patient/guardian denies using alcohol.                                                          

- Ebola Screening: : Patient negative for fever greater than or equal to 101.5 degrees            

  Fahrenheit, and additional compatible Ebola Virus Disease symptoms Patient denies               

  exposure to infectious person Patient denies travel to an Ebola-affected area in the            

  21 days before illness onset.                                                                   

                                                                                                  

                                                                                                  

Screening:                                                                                        

10:32 Abuse screen: Denies threats or abuse. Denies injuries from another. Nutritional        hj  

      screening: No deficits noted. Tuberculosis screening: No symptoms or risk factors           

      identified. Fall Risk None identified.                                                      

                                                                                                  

Assessment:                                                                                       

10:32 GI: Bowel sounds present X 4 quads. Abd is soft Abdomen is tender to palpation.         hj  

12:12 General: Appears in no apparent distress. Behavior is calm, cooperative. Pain:          la1 

      Complains of pain in suprapubic area, right lower quadrant and left lower quadrant.         

      Neuro: Level of Consciousness is awake, alert, obeys commands, Oriented to person,          

      place, time, situation. Cardiovascular: Capillary refill < 3 seconds Patient's skin is      

      warm and dry. Respiratory: Airway is patent Respiratory effort is even, unlabored,          

      Respiratory pattern is regular, symmetrical, Breath sounds are clear bilaterally. GI:       

      Abd is soft X 4 quads Abdomen is tender to palpation in suprapubic area, right lower        

      quadrant and left lower quadrant. : No signs and/or symptoms were reported regarding      

      the genitourinary system.                                                                   

14:08 Reassessment: Patient appears in no apparent distress at this time. No changes from     la1 

      previously documented assessment. Patient and/or family updated on plan of care and         

      expected duration. Pain level reassessed. Patient is alert, oriented x 3, equal             

      unlabored respirations, skin warm/dry/pink.                                                 

14:14 Reassessment: Patient appears in no apparent distress at this time. No changes from     la1 

      previously documented assessment. Patient and/or family updated on plan of care and         

      expected duration. Pain level reassessed. Patient is alert, oriented x 3, equal             

      unlabored respirations, skin warm/dry/pink.                                                 

                                                                                                  

Vital Signs:                                                                                      

10:33  / 89; Pulse 67; Resp 18; Temp 98.6(TE); Pulse Ox 100% on R/A; Weight 74.39 kg;   hj  

      Height 5 ft. 4 in. (162.56 cm); Pain 6/10;                                                  

14:49  / 73; Pulse 84; Resp 18; Pulse Ox 98% on R/A;                                    la1 

10:33 Body Mass Index 28.15 (74.39 kg, 162.56 cm)                                               

                                                                                                  

ED Course:                                                                                        

10:17 Patient arrived in ED.                                                                  mr  

10:18 None, None is Private Physician.                                                        mr  

10:30 Triage completed.                                                                       hj  

10:32 Arm band placed on right wrist.                                                         hj  

10:34 Patient has correct armband on for positive identification. Placed in gown. Bed in low  hj  

      position. Call light in reach. Side rails up X 1.                                           

11:14 Timi Padilla MD is Attending Physician.                                              gs  

11:32 Tamir Matias, RN is Primary Nurse.                                                       la1 

12:12 No provider procedures requiring assistance completed. Inserted saline lock: 20 gauge   la1 

      in left antecubital area, using aseptic technique. Blood collected.                         

14:49 IV discontinued, intact, bleeding controlled, No redness/swelling at site. Pressure     la1 

      dressing applied.                                                                           

                                                                                                  

Administered Medications:                                                                         

12:11 Drug: Norco 5 mg-325 mg 1 tabs Route: PO;                                               la1 

14:09 Follow up: Response: No adverse reaction; Pain is decreased                             la1 

                                                                                                  

                                                                                                  

Outcome:                                                                                          

14:29 Discharge ordered by MD.                                                                gs  

14:49 Discharged to home ambulatory.                                                          la1 

14:49 Condition: good                                                                             

14:49 Discharge instructions given to patient, Instructed on discharge instructions, follow       

      up and referral plans. medication usage, Demonstrated understanding of instructions,        

      follow-up care, medications, Prescriptions given X 1.                                       

14:49 Patient left the ED.                                                                    la1 

                                                                                                  

Signatures:                                                                                       

Mary Girard                                 mr                                                   

Tamir Matias, RN                         RN   la1                                                  

Sin Pace RN                      RN                                                      

Timi Padilla MD MD                                                      

                                                                                                  

Corrections: (The following items were deleted from the chart)                                    

10:35 10:33 Pulse 67bpm; Resp 18bpm; Pulse Ox 100% RA; Temp 98.6F Temporal; 74.39 kg; Height  hj  

      5 ft. 4 in.; BMI: 28.1; Pain 6/10; hj                                                       

                                                                                                  

**************************************************************************************************

## 2019-09-18 ENCOUNTER — HOSPITAL ENCOUNTER (EMERGENCY)
Dept: HOSPITAL 97 - ER | Age: 34
LOS: 1 days | Discharge: HOME | End: 2019-09-19
Payer: COMMERCIAL

## 2019-09-18 DIAGNOSIS — R51: Primary | ICD-10-CM

## 2019-09-18 DIAGNOSIS — R19.7: ICD-10-CM

## 2019-09-18 LAB
BUN BLD-MCNC: 14 MG/DL (ref 7–18)
GLUCOSE SERPLBLD-MCNC: 108 MG/DL (ref 74–106)
HCT VFR BLD CALC: 38.5 % (ref 36–45)
LYMPHOCYTES # SPEC AUTO: 2.9 K/UL (ref 0.7–4.9)
PMV BLD: 7.9 FL (ref 7.6–11.3)
POTASSIUM SERPL-SCNC: 3.4 MMOL/L (ref 3.5–5.1)
RBC # BLD: 4.81 M/UL (ref 3.86–4.86)

## 2019-09-18 PROCEDURE — 96375 TX/PRO/DX INJ NEW DRUG ADDON: CPT

## 2019-09-18 PROCEDURE — 99284 EMERGENCY DEPT VISIT MOD MDM: CPT

## 2019-09-18 PROCEDURE — 36415 COLL VENOUS BLD VENIPUNCTURE: CPT

## 2019-09-18 PROCEDURE — 85025 COMPLETE CBC W/AUTO DIFF WBC: CPT

## 2019-09-18 PROCEDURE — 96374 THER/PROPH/DIAG INJ IV PUSH: CPT

## 2019-09-18 PROCEDURE — 96361 HYDRATE IV INFUSION ADD-ON: CPT

## 2019-09-18 PROCEDURE — 80048 BASIC METABOLIC PNL TOTAL CA: CPT

## 2019-09-18 NOTE — XMS REPORT
[facility]

 Created on:2019



Patient:Catalina Orosco

Sex:Female

:1985

External Reference #:[id number]





Demographics







 Address  [address] [place] [id number]

 

 Phone  [phone]

 

 Preferred Language  en

 

 Marital Status  Unknown

 

 Orthodox Affiliation  Unknown

 

 Race  White

 

 Ethnic Group  Unknown









Author







 Organization  [employer]









Care Team Providers







 Name  Role  Phone

 

 Liz Chance  Provider Role  Unavailable









Allergies, Adverse Reactions, Alerts







 Substance  Reaction  Event Type

 

 N.K.D.A.  Info Not Available  Non Drug Allergy







Problems







 Problem Type  Condition  Code  Onset Dates  Condition Status

 

 Problem  Migraine, unspecified, not  G43.901    Active



   intractable, with status      



   migrainosus      

 

 Problem  Microcytic anemia  D50.9    Active

 

 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



   grade dysplasia      

 

 Problem  Hair loss disorder  L65.9    Active

 

 Problem  Reactive depression  F32.9    Active

 

 Problem  Contusion of right little finger  S60.051A    Active



   without damage to nail, initial      



   encounter      

 

 Problem  Irregular periods/menstrual cycles  N92.6    Active

 

 Problem  Cervicalgia  M54.2    Active

 

 Problem  Low self esteem  R45.81    Active

 

 Problem  Primary insomnia  F51.01    Active

 

 Assessment  Hair loss disorder  L65.9    Active

 

 Problem  Hypoglycemia  E16.2    Active

 

 Assessment  Reactive depression  F32.9    Active

 

 Problem  Anxiety  F41.9    Active

 

 Assessment  Contusion of right little finger  S60.051A    Active



   without damage to nail, initial      



   encounter      

 

 Problem  Lumbar sprain  S33.5XXA    Active







Medications







 Medication  Code  Code  Instructions  Start  End  Status  Dosage



   System      Date  Date    

 

 Dicyclomine HCl  NDC  25294489795  10 MG Orally      Active  2 capsules



       PRN        

 

 Biotin Maximum  NDC  31213868036  92660 MCG      Active  1 tablet



 Strength      Orally Once a        



       day        

 

 Pantoprazole  NDC  62661623347  40 MG Orally      Active  1 tablet



 Sodium      Once a day        

 

 Bentyl  NDC  [id number]  20 MG Orally  March    Active  1 tablet



       Four times a  2018      



       day        

 

 Sumatriptan  NDC  50062583331  50 MG Orally  April    Active  1 tablet as



 Succinate      take at start  2018      needed



       of migrane, if        



       no relief take        



       1 more in 2        



       hours.  Max        



       200mg/day        

 

 Hair Skin Nails  NDC  22908739601  - Orally      Active  as directed

 

 Trazodone HCl  NDC  [id number]  50 MG Orally  ,    Active  1.5 tablet



       Once a day        at bedtime



               as needed

 

 Trokendi XR  NDC  87553182576  50 MG Orally      Active  1 capsule



       Once a day        

 

 Excedrin Tension  NDC  66325163986  500-65 MG      Active  2 tablets



 Headache      Orally Three        as needed



       times a day        

 

 Ondansetron HCl  NDC  02088230047  4 MG Orally  March    Active  not defined



         2018      

 

 Dexilant  NDC  80712361692  60 MG Orally      Active  1 capsule



       Once a day        

 

 Vitamin D-3  NDC  83333-77256   Orally      Active  1 capsule



       Once a day        







Results

No Known Results



Summary Purpose

[employer] Submission

## 2019-09-19 VITALS — DIASTOLIC BLOOD PRESSURE: 84 MMHG | SYSTOLIC BLOOD PRESSURE: 116 MMHG

## 2019-09-19 VITALS — TEMPERATURE: 97.3 F | OXYGEN SATURATION: 100 %

## 2019-09-19 NOTE — ER
Nurse's Notes                                                                                     

 [facility]                                                                 

Name: Catalina Orosco                                                                                

Age: 34 yrs                                                                                       

Sex: Female                                                                                       

: 1985                                                                                   

MRN: [id number]                                                                                   

Arrival Date: 2019                                                                          

Time: 22:37                                                                                       

Account#: [id number]                                                                            

Bed 5                                                                                             

Private MD:                                                                                       

Diagnosis: Headache;Nausea and vomiting;Diarrhea, unspecified                                     

                                                                                                  

Presentation:                                                                                     

                                                                                             

22:45 Presenting complaint: Patient states: started this morning nausea, dizziness and        rr5 

      diarrhea. i drink a lot of water, i went home and sleep. around 930PM i woke up having      

      this headache i took hydrocodone but it did not relieved the pain. pain score 10/10         

      denies any abdominal pain.                                                                  

22:45 Transition of care: patient was not received from another setting of care. Onset of     rr5 

      symptoms was 2019. Risk Assessment: Do you want to hurt yourself or           

      someone else? Patient reports no desire to harm self or others. Initial Sepsis Screen:      

      Does the patient meet any 2 criteria? No. Patient's initial sepsis screen is negative.      

      Does the patient have a suspected source of infection? No. Patient's initial sepsis         

      screen is negative. Care prior to arrival: Medication(s) given: hydrocodone.                

22:45 Method Of Arrival: Wheelchair                                                           rr5 

22:45 Acuity: ALYSIA 3                                                                           rr5 

                                                                                                  

OB/GYN:                                                                                           

22:45 LMP N/A - Birth control method, (IUD). spotting noted last week                         rr5 

                                                                                                  

Historical:                                                                                       

- Allergies:                                                                                      

22:45 No Known Allergies;                                                                     rr5 

- Home Meds:                                                                                      

22:45 Dexilant 60 mg Oral CpDB 1 cap once daily [Active]; dicyclomine 20 mg Oral tab 1 tab 3  rr5 

      times per day [Active];                                                                     

- PMHx:                                                                                           

22:45 garzon; Endometriosis; Migraines;                                                      rr5 

- PSHx:                                                                                           

22:45 Ear Tubes; bone cyst;                                                                   rr5 

                                                                                                  

- Immunization history:: Adult Immunizations up to date.                                          

- Social history:: Smoking status: Patient/guardian denies using tobacco,                         

  Patient/guardian denies using alcohol, street drugs.                                            

- Ebola Screening: : Patient negative for fever greater than or equal to 101.5 degrees            

  Fahrenheit, and additional compatible Ebola Virus Disease symptoms Patient denies               

  exposure to infectious person Patient denies travel to an Ebola-affected area in the            

  21 days before illness onset.                                                                   

                                                                                                  

                                                                                                  

Screenin:55 Abuse screen: Denies threats or abuse. Denies injuries from another. Nutritional        rr5 

      screening: No deficits noted. Tuberculosis screening: No symptoms or risk factors           

      identified. Fall Risk IV access (20 points). Total [place] Fall Scale indicates No Risk       

      (0-24 pts).                                                                                 

                                                                                                  

Assessment:                                                                                       

23:00 General: Appears in no apparent distress. uncomfortable, Behavior is calm, cooperative, rr5 

      appropriate for age. Pain: Complains of pain in head Pain does not radiate. Pain            

      currently is 10 out of 10 on a pain scale. Quality of pain is described as aching, Pain     

      began 1 hour ago. Is continuous.                                                            

23:00 Neuro: Level of Consciousness is awake, alert, obeys commands, Oriented to person,      rr5 

      place, time, situation, Appropriate for age Moves all extremities. Full function Speech     

      is normal, Facial symmetry appears normal, Reports headache. Cardiovascular: Capillary      

      refill < 3 seconds Patient's skin is warm and dry. Respiratory: Airway is patent            

      Respiratory effort is even, unlabored, Respiratory pattern is regular, symmetrical. GI:     

      Abdomen is round Pt is actively vomiting brown fluid Reports diarrhea, nausea,              

      vomiting. : No signs and/or symptoms were reported regarding the genitourinary            

      system. EENT: No signs and/or symptoms were reported regarding the EENT system. Derm:       

      Skin is intact, Skin temperature is warm. Musculoskeletal: Circulation, motion, and         

      sensation intact. Capillary refill < 3 seconds.                                             

23:21 Reassessment: Patient appears in no apparent distress at this time. Patient and/or      rr5 

      family updated on plan of care and expected duration. Pain level reassessed. Patient is     

      alert, oriented x 3, equal unlabored respirations, skin warm/dry/pink. awaiting for         

      laboratory results. Patient states feeling better. Patient states symptoms have             

      improved.                                                                                   

                                                                                             

00:21 Reassessment: Patient complaint of lower abdominal cramping; Provider notified, verbal  lp1 

      order for Bentyl 20mg PO.                                                                   

00:47 Reassessment: Patient is alert, oriented x 3, equal unlabored respirations, skin        lp1 

      warm/dry/pink. Patient states feeling better. Patient states symptoms have improved.        

                                                                                                  

Vital Signs:                                                                                      

                                                                                             

22:45  / 110; Pulse 73; Resp 19; Temp 97.3; Pulse Ox 100% ; Weight 72.57 kg; Height 5   rr5 

      ft. 4 in. (162.56 cm); Pain 10/10;                                                          

23:11 BP 97 / 58; Pulse 77; Resp 17; Pulse Ox 100% ;                                          rr5 

                                                                                             

00:15  / 84; Pulse 74; Resp 16; Pulse Ox 100% on R/A;                                   lp1 

                                                                                             

22:45 Body Mass Index 27.46 (72.57 kg, 162.56 cm)                                             rr5 

                                                                                                  

ED Course:                                                                                        

                                                                                             

22:37 Patient arrived in ED.                                                                  cf2 

22:39 [facility]                                                        kb  

22:39 Timi Padilla MD is Attending Physician.                                              kb  

22:45 Arm band placed on right wrist.                                                         rr5 

22:48 Josemanuel Rosen, RN is Primary Nurse.                                                    rr5 

22:50 Inserted saline lock: 20 gauge in left antecubital area, using aseptic technique.       rr5 

      ,using aseptic technique. by lorena ED tech Blood collected.                               

22:53 Triage completed.                                                                       rr5 

23:04 Patient has correct armband on for positive identification. Bed in low position. Call   rr5 

      light in reach. Side rails up X2. Pulse ox on. NIBP on.                                     

                                                                                             

00:22 No provider procedures requiring assistance completed.                                  lp1 

00:46 IV discontinued, No redness/swelling at site. Pressure dressing applied.                lp1 

                                                                                                  

Administered Medications:                                                                         

                                                                                             

23:03 Drug: NS 0.9% 1000 ml Route: IV; Rate: 1000 ml; Site: left antecubital;                 lp1 

23:50 Follow up: Response: No adverse reaction; IV Status: Completed infusion; IV Intake:     rr5 

      1000ml                                                                                      

23:03 Drug: Zofran 4 mg Route: IVP; Site: left antecubital;                                   lp1 

                                                                                             

00:15 Follow up: Response: No adverse reaction                                                rr5 

                                                                                             

23:03 Drug: TORadol - Ketorolac 15 mg Route: IVP; Site: left antecubital;                     lp1 

                                                                                             

00:03 Follow up: Response: No adverse reaction                                                rr5 

00:21 Drug: Bentyl 20 mg Route: PO;                                                           lp1 

00:46 Follow up: Response: No adverse reaction                                                lp1 

                                                                                                  

                                                                                                  

Intake:                                                                                           

                                                                                             

23:50 IV: 1000ml; Total: 1000ml.                                                              rr5 

                                                                                                  

Outcome:                                                                                          

                                                                                             

00:05 Discharge ordered by MD.                                                                kb  

00:46 Discharged to home ambulatory, with friend.                                             lp1 

00:46 Condition: good                                                                             

00:46 Discharge instructions given to patient, Instructed on discharge instructions, follow       

      up and referral plans. medication usage, Demonstrated understanding of instructions,        

      follow-up care, medications, Prescriptions given X 1.                                       

00:47 Patient left the ED.                                                                    lp1 

                                                                                                  

Signatures:                                                                                       

Arielle Kate, ISABELLAP-C                 FNP-Cherie Todd RN                         RN   lp1                                                  

Josemanuel Rosen RN                      RN   rr5                                                  

Kaia Martinez                             2                                                  

                                                                                                  

**************************************************************************************************

## 2019-09-19 NOTE — EDPHYS
Physician Documentation                                                                           

 [facility]                                                                 

Name: Catalina Orosco                                                                                

Age: 34 yrs                                                                                       

Sex: Female                                                                                       

: 1985                                                                                   

MRN: [id number]                                                                                   

Arrival Date: 2019                                                                          

Time: 22:37                                                                                       

Account#: [id number]                                                                            

Bed 5                                                                                             

Private MD:                                                                                       

ED Physician Timi Padilla                                                                       

HPI:                                                                                              

                                                                                             

00:06 This 34 yrs old  Female presents to ER via Wheelchair with complaints of       kb  

      Nausea/Vomiting/Diarrhea, Headache.                                                         

00:06 The patient presents to the emergency department with nausea, vomiting, diarrhea.       kb  

      Onset: The symptoms/episode began/occurred this morning. Possible causes: unknown. The      

      symptoms are aggravated by nothing. The symptoms are alleviated by nothing. Associated      

      signs and symptoms: Pertinent positives: diarrhea, nausea, vomiting. Severity of            

      symptoms: At their worst the symptoms were moderate in the emergency department the         

      symptoms are unchanged. The patient has not experienced similar symptoms in the past.       

      The patient has not recently seen a physician. Pt reports nausea and diarrhea that          

      started early this morning. HEadache started about an hour pta with vomiting.               

                                                                                                  

OB/GYN:                                                                                           

                                                                                             

22:45 LMP N/A - Birth control method, (IUD). spotting noted last week                         rr5 

                                                                                                  

Historical:                                                                                       

- Allergies:                                                                                      

22:45 No Known Allergies;                                                                     rr5 

- Home Meds:                                                                                      

22:45 Dexilant 60 mg Oral CpDB 1 cap once daily [Active]; dicyclomine 20 mg Oral tab 1 tab 3  rr5 

      times per day [Active];                                                                     

- PMHx:                                                                                           

22:45 garzon; Endometriosis; Migraines;                                                      rr5 

- PSHx:                                                                                           

22:45 Ear Tubes; bone cyst;                                                                   rr5 

                                                                                                  

- Immunization history:: Adult Immunizations up to date.                                          

- Social history:: Smoking status: Patient/guardian denies using tobacco,                         

  Patient/guardian denies using alcohol, street drugs.                                            

- Ebola Screening: : Patient negative for fever greater than or equal to 101.5 degrees            

  Fahrenheit, and additional compatible Ebola Virus Disease symptoms Patient denies               

  exposure to infectious person Patient denies travel to an Ebola-affected area in the            

  21 days before illness onset.                                                                   

                                                                                                  

                                                                                                  

ROS:                                                                                              

                                                                                             

00:06 Constitutional: Negative for fever, chills, and weight loss, ENT: Negative for injury,  kb  

      pain, and discharge, Neck: Negative for injury, pain, and swelling, Cardiovascular:         

      Negative for chest pain, palpitations, and edema, Respiratory: Negative for shortness       

      of breath, cough, wheezing, and pleuritic chest pain, : Negative for injury,              

      bleeding, discharge, and swelling, MS/Extremity: Negative for injury and deformity,         

      Skin: Negative for injury, rash, and discoloration.                                         

      Abdomen/GI: Positive for nausea, vomiting, and diarrhea.                                    

      Neuro: Positive for headache.                                                               

                                                                                                  

Exam:                                                                                             

00:06 Constitutional:  This is a well developed, well nourished patient who is awake, alert,  kb  

      and in no acute distress. Head/Face:  Normocephalic, atraumatic. Eyes:  Pupils equal        

      round and reactive to light, extra-ocular motions intact.  Lids and lashes normal.          

      Conjunctiva and sclera are non-icteric and not injected.  Cornea within normal limits.      

      Periorbital areas with no swelling, redness, or edema. ENT:  Nares patent. No nasal         

      discharge, no septal abnormalities noted.  Tympanic membranes are normal and external       

      auditory canals are clear.  Oropharynx with no redness, swelling, or masses, exudates,      

      or evidence of obstruction, uvula midline.  Mucous membranes moist. Neck:  Trachea          

      midline, no thyromegaly or masses palpated, and no cervical lymphadenopathy.  Supple,       

      full range of motion without nuchal rigidity, or vertebral point tenderness.  No            

      Meningismus. Chest/axilla:  Normal chest wall appearance and motion.  Nontender with no     

      deformity.  No lesions are appreciated. Cardiovascular:  Regular rate and rhythm with a     

      normal S1 and S2.  No gallops, murmurs, or rubs.  Normal PMI, no JVD.  No pulse             

      deficits. Respiratory:  Lungs have equal breath sounds bilaterally, clear to                

      auscultation and percussion.  No rales, rhonchi or wheezes noted.  No increased work of     

      breathing, no retractions or nasal flaring. Abdomen/GI:  Soft, non-tender, with normal      

      bowel sounds.  No distension or tympany.  No guarding or rebound.  No evidence of           

      tenderness throughout. Back:  No spinal tenderness.  No costovertebral tenderness.          

      Full range of motion. Skin:  Warm, dry with normal turgor.  Normal color with no            

      rashes, no lesions, and no evidence of cellulitis. MS/ Extremity:  Pulses equal, no         

      cyanosis.  Neurovascular intact.  Full, normal range of motion. Neuro:  Awake and           

      alert, GCS 15, oriented to person, place, time, and situation.  Cranial nerves II-XII       

      grossly intact.  Motor strength 5/5 in all extremities.  Sensory grossly intact.            

      Cerebellar exam normal.  Normal gait.                                                       

                                                                                                  

Vital Signs:                                                                                      

                                                                                             

22:45  / 110; Pulse 73; Resp 19; Temp 97.3; Pulse Ox 100% ; Weight 72.57 kg; Height 5   rr5 

      ft. 4 in. (162.56 cm); Pain 10/10;                                                          

23:11 BP 97 / 58; Pulse 77; Resp 17; Pulse Ox 100% ;                                          rr5 

                                                                                             

00:15  / 84; Pulse 74; Resp 16; Pulse Ox 100% on R/A;                                   lp1 

                                                                                             

22:45 Body Mass Index 27.46 (72.57 kg, 162.56 cm)                                             rr5 

                                                                                                  

MDM:                                                                                              

                                                                                             

22:39 Patient medically screened.                                                             kb  

                                                                                             

00:08 Data reviewed: vital signs, nurses notes. Data interpreted: Pulse oximetry: on room air kb  

      is 100 %. Interpretation: normal. Counseling: I had a detailed discussion with the          

      patient and/or guardian regarding: the historical points, exam findings, and any            

      diagnostic results supporting the discharge/admit diagnosis, lab results, the need for      

      outpatient follow up, a family practitioner, to return to the emergency department if       

      symptoms worsen or persist or if there are any questions or concerns that arise at          

      home. ED course: Pt reports she is feeling much better now. Laughing and talking with       

      friend.                                                                                     

                                                                                                  

                                                                                             

22:53 Order name: Basic Metabolic Panel; Complete Time: 23:27                                 kb  

                                                                                             

22:53 Order name: CBC with Diff                                                               kb  

                                                                                             

22:53 Order name: IV Saline Lock; Complete Time: 22:53                                        kb  

                                                                                             

22:53 Order name: Labs collected and sent; Complete Time: 22:53                               kb  

                                                                                                  

Administered Medications:                                                                         

                                                                                             

23:03 Drug: NS 0.9% 1000 ml Route: IV; Rate: 1000 ml; Site: left antecubital;                 lp1 

23:50 Follow up: Response: No adverse reaction; IV Status: Completed infusion; IV Intake:     rr5 

      1000ml                                                                                      

23:03 Drug: Zofran 4 mg Route: IVP; Site: left antecubital;                                   lp1 

                                                                                             

00:15 Follow up: Response: No adverse reaction                                                rr5 

                                                                                             

23:03 Drug: TORadol - Ketorolac 15 mg Route: IVP; Site: left antecubital;                     lp1 

                                                                                             

00:03 Follow up: Response: No adverse reaction                                                rr5 

00:21 Drug: Bentyl 20 mg Route: PO;                                                           lp1 

00:46 Follow up: Response: No adverse reaction                                                lp1 

                                                                                                  

                                                                                                  

Disposition:                                                                                      

19 00:05 Discharged to Home. Impression: Headache, Nausea and vomiting, Diarrhea,           

  unspecified.                                                                                    

- Condition is Stable.                                                                            

- Discharge Instructions: Food Choices to Help Relieve Diarrhea, Adult, Viral                     

  Gastroenteritis, Adult, Easy-to-Read, General Headache Without Cause, Easy-to-Read.             

- Prescriptions for Zofran 4 mg Oral Tablet - take 1 tablet by ORAL route every 6 hours           

  As needed; 20 tablet.                                                                           

- Medication Reconciliation Form, Thank You Letter, Antibiotic Education, Prescription            

  Opioid Use form.                                                                                

- Follow up: Emergency Department; When: As needed; Reason: Worsening of condition.               

  Follow up: Private Physician; When: 2 - 3 days; Reason: Recheck today's complaints,             

  Continuance of care, Re-evaluation by your physician.                                           

                                                                                                  

                                                                                                  

                                                                                                  

Signatures:                                                                                       

Dispatcher [employer]                           EDArielle Albright, JAVIER-C                 FNP-Cherie Todd, RN                         RN   lp1                                                  

Josemanuel Rosen RN                      RN   rr5                                                  

                                                                                                  

Corrections: (The following items were deleted from the chart)                                    

00:47 00:05 2019 00:05 Discharged to Home. Impression: Headache; Nausea and vomiting;   lp1 

      Diarrhea, unspecified. Condition is Stable. Forms are Medication Reconciliation Form,       

      Thank You Letter, Antibiotic Education, Prescription Opioid Use. Follow up: Emergency       

      Department; When: As needed; Reason: Worsening of condition. Follow up: Private             

      Physician; When: 2 - 3 days; Reason: Recheck today's complaints, Continuance of care,       

      Re-evaluation by your physician. kb                                                         

                                                                                                  

**************************************************************************************************

## 2021-09-04 NOTE — XMS REPORT
[facility]

 Created on:2019



Patient:Catalina Orosco

Sex:Female

:1985

External Reference #:[id number]





Demographics







 Address  [address] [place] [id number]

 

 Phone  [phone]

 

 Preferred Language  en

 

 Marital Status  Unknown

 

 [employer] Affiliation  Unknown

 

 Race  White

 

 Ethnic Group  Unknown









Author







 Organization  [employer]









Care Team Providers







 Name  Role  Phone

 

 Liz Chance  Provider Role  Unavailable









Allergies

No Known Allergies



Problems







 Problem Type  Condition  Code  Onset Dates  Condition Status

 

 Problem  Migraine, unspecified, not  G43.901    Active



   intractable, with status      



   migrainosus      

 

 Problem  Microcytic anemia  D50.9    Active

 

 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



   grade dysplasia      

 

 Problem  Hypoglycemia  E16.2    Active

 

 Problem  Anxiety  F41.9    Active

 

 Problem  Lumbar sprain  S33.5XXA    Active

 

 Problem  Hair loss disorder  L65.9    Active

 

 Problem  Reactive depression  F32.9    Active

 

 Problem  Contusion of right little finger  S60.051A    Active



   without damage to nail, initial      



   encounter      

 

 Problem  Irregular periods/menstrual cycles  N92.6    Active

 

 Problem  Cervicalgia  M54.2    Active

 

 Problem  Low self esteem  R45.81    Active

 

 Problem  Primary insomnia  F51.01    Active







Medications

No Known Medications



Results

No Known Results



Summary Purpose

[employer] Submission Self